# Patient Record
Sex: FEMALE | Race: WHITE | NOT HISPANIC OR LATINO | Employment: UNEMPLOYED | URBAN - METROPOLITAN AREA
[De-identification: names, ages, dates, MRNs, and addresses within clinical notes are randomized per-mention and may not be internally consistent; named-entity substitution may affect disease eponyms.]

---

## 2020-12-30 ENCOUNTER — TELEPHONE (OUTPATIENT)
Dept: PHYSICAL MEDICINE AND REHAB | Facility: CLINIC | Age: 25
End: 2020-12-30

## 2020-12-30 NOTE — TELEPHONE ENCOUNTER
Called patient, no answer.  Patient to contact scheduling department to schedule new patient appointment.

## 2020-12-30 NOTE — TELEPHONE ENCOUNTER
----- Message from Sidney Mcdonald sent at 12/30/2020 12:26 PM CST -----  Contact: Pt. [phone]  The patient would like to speak to someone regarding scheduling. Please contact the patient to discuss further.

## 2021-01-05 ENCOUNTER — TELEPHONE (OUTPATIENT)
Dept: PHYSICAL MEDICINE AND REHAB | Facility: CLINIC | Age: 26
End: 2021-01-05

## 2021-01-29 ENCOUNTER — TELEPHONE (OUTPATIENT)
Dept: AUDIOLOGY | Facility: CLINIC | Age: 26
End: 2021-01-29

## 2021-02-10 ENCOUNTER — CLINICAL SUPPORT (OUTPATIENT)
Dept: AUDIOLOGY | Facility: CLINIC | Age: 26
End: 2021-02-10
Payer: MEDICAID

## 2021-02-10 DIAGNOSIS — H81.12 BENIGN PAROXYSMAL POSITIONAL VERTIGO OF LEFT EAR: Primary | ICD-10-CM

## 2021-02-10 DIAGNOSIS — H81.8X1 RIGHT-SIDED VESTIBULAR WEAKNESS: ICD-10-CM

## 2021-02-10 PROCEDURE — 92537 PR CALORIC VSTBLR TEST W/REC BITHERMAL: ICD-10-PCS | Mod: 26,S$PBB,, | Performed by: AUDIOLOGIST

## 2021-02-10 PROCEDURE — 92540 BASIC VESTIBULAR EVALUATION: CPT | Mod: 26,S$PBB,, | Performed by: AUDIOLOGIST

## 2021-02-10 PROCEDURE — 92537 CALORIC VSTBLR TEST W/REC: CPT | Mod: PBBFAC | Performed by: AUDIOLOGIST

## 2021-02-10 PROCEDURE — 92540 PR VESTIBULAR EVAL NYSTAG FOVL&PERPH STIM OSCIL TRACKING: ICD-10-PCS | Mod: 26,S$PBB,, | Performed by: AUDIOLOGIST

## 2021-02-10 PROCEDURE — 92540 BASIC VESTIBULAR EVALUATION: CPT | Mod: PBBFAC | Performed by: AUDIOLOGIST

## 2021-02-10 PROCEDURE — 92537 CALORIC VSTBLR TEST W/REC: CPT | Mod: 26,S$PBB,, | Performed by: AUDIOLOGIST

## 2021-04-19 ENCOUNTER — PATIENT MESSAGE (OUTPATIENT)
Dept: ENDOCRINOLOGY | Facility: CLINIC | Age: 26
End: 2021-04-19

## 2021-04-26 ENCOUNTER — PATIENT MESSAGE (OUTPATIENT)
Dept: RESEARCH | Facility: HOSPITAL | Age: 26
End: 2021-04-26

## 2021-08-06 ENCOUNTER — OFFICE VISIT (OUTPATIENT)
Dept: ENDOCRINOLOGY | Facility: CLINIC | Age: 26
End: 2021-08-06
Attending: INTERNAL MEDICINE
Payer: MEDICAID

## 2021-08-06 DIAGNOSIS — F41.9 ANXIETY AND DEPRESSION: ICD-10-CM

## 2021-08-06 DIAGNOSIS — F32.A ANXIETY AND DEPRESSION: ICD-10-CM

## 2021-08-06 PROCEDURE — 99204 PR OFFICE/OUTPT VISIT, NEW, LEVL IV, 45-59 MIN: ICD-10-PCS | Mod: 95,KX,, | Performed by: INTERNAL MEDICINE

## 2021-08-06 PROCEDURE — 99204 OFFICE O/P NEW MOD 45 MIN: CPT | Mod: 95,KX,, | Performed by: INTERNAL MEDICINE

## 2021-08-06 RX ORDER — SUMATRIPTAN 50 MG/1
50 TABLET, FILM COATED ORAL
COMMUNITY
Start: 2021-03-12 | End: 2021-12-01

## 2021-08-06 RX ORDER — TESTOSTERONE GEL, 1% 10 MG/G
GEL TRANSDERMAL
Qty: 30 PACKET | Refills: 5 | Status: SHIPPED | OUTPATIENT
Start: 2021-08-06 | End: 2022-02-24 | Stop reason: SDUPTHER

## 2021-08-06 RX ORDER — DESVENLAFAXINE SUCCINATE 50 MG/1
50 TABLET, EXTENDED RELEASE ORAL DAILY
COMMUNITY
End: 2021-12-01

## 2021-08-06 RX ORDER — VERAPAMIL HYDROCHLORIDE 100 MG/1
CAPSULE, EXTENDED RELEASE ORAL DAILY
COMMUNITY
End: 2021-12-01

## 2021-08-06 RX ORDER — GABAPENTIN 600 MG/1
600 TABLET ORAL 3 TIMES DAILY
COMMUNITY
End: 2021-12-01

## 2021-11-19 ENCOUNTER — OFFICE VISIT (OUTPATIENT)
Dept: ENDOCRINOLOGY | Facility: CLINIC | Age: 26
End: 2021-11-19
Attending: INTERNAL MEDICINE
Payer: MEDICAID

## 2021-11-19 ENCOUNTER — LAB VISIT (OUTPATIENT)
Dept: LAB | Facility: HOSPITAL | Age: 26
End: 2021-11-19
Attending: INTERNAL MEDICINE
Payer: MEDICAID

## 2021-11-19 VITALS
HEART RATE: 84 BPM | BODY MASS INDEX: 26.99 KG/M2 | OXYGEN SATURATION: 97 % | DIASTOLIC BLOOD PRESSURE: 77 MMHG | SYSTOLIC BLOOD PRESSURE: 120 MMHG | HEIGHT: 67 IN | WEIGHT: 171.94 LBS

## 2021-11-19 DIAGNOSIS — F41.9 ANXIETY AND DEPRESSION: ICD-10-CM

## 2021-11-19 DIAGNOSIS — F32.A ANXIETY AND DEPRESSION: ICD-10-CM

## 2021-11-19 LAB
25(OH)D3+25(OH)D2 SERPL-MCNC: 43 NG/ML (ref 30–96)
ALBUMIN SERPL BCP-MCNC: 4.4 G/DL (ref 3.5–5.2)
ALP SERPL-CCNC: 58 U/L (ref 55–135)
ALT SERPL W/O P-5'-P-CCNC: 17 U/L (ref 10–44)
ANION GAP SERPL CALC-SCNC: 12 MMOL/L (ref 8–16)
AST SERPL-CCNC: 17 U/L (ref 10–40)
BILIRUB SERPL-MCNC: 0.6 MG/DL (ref 0.1–1)
BUN SERPL-MCNC: 13 MG/DL (ref 6–20)
CALCIUM SERPL-MCNC: 10.1 MG/DL (ref 8.7–10.5)
CHLORIDE SERPL-SCNC: 101 MMOL/L (ref 95–110)
CHOLEST SERPL-MCNC: 199 MG/DL (ref 120–199)
CHOLEST/HDLC SERPL: 2.6 {RATIO} (ref 2–5)
CO2 SERPL-SCNC: 26 MMOL/L (ref 23–29)
CREAT SERPL-MCNC: 0.8 MG/DL (ref 0.5–1.4)
ERYTHROCYTE [DISTWIDTH] IN BLOOD BY AUTOMATED COUNT: 11.9 % (ref 11.5–14.5)
EST. GFR  (AFRICAN AMERICAN): >60 ML/MIN/1.73 M^2
EST. GFR  (NON AFRICAN AMERICAN): >60 ML/MIN/1.73 M^2
ESTIMATED AVG GLUCOSE: 88 MG/DL (ref 68–131)
GLUCOSE SERPL-MCNC: 72 MG/DL (ref 70–110)
HBA1C MFR BLD: 4.7 % (ref 4–5.6)
HCT VFR BLD AUTO: 43.8 % (ref 37–48.5)
HDLC SERPL-MCNC: 78 MG/DL (ref 40–75)
HDLC SERPL: 39.2 % (ref 20–50)
HGB BLD-MCNC: 14.8 G/DL (ref 12–16)
LDLC SERPL CALC-MCNC: 100.6 MG/DL (ref 63–159)
MCH RBC QN AUTO: 31 PG (ref 27–31)
MCHC RBC AUTO-ENTMCNC: 33.8 G/DL (ref 32–36)
MCV RBC AUTO: 92 FL (ref 82–98)
NONHDLC SERPL-MCNC: 121 MG/DL
PLATELET # BLD AUTO: 238 K/UL (ref 150–450)
PMV BLD AUTO: 10.5 FL (ref 9.2–12.9)
POTASSIUM SERPL-SCNC: 4.4 MMOL/L (ref 3.5–5.1)
PROT SERPL-MCNC: 8.1 G/DL (ref 6–8.4)
RBC # BLD AUTO: 4.78 M/UL (ref 4–5.4)
SODIUM SERPL-SCNC: 139 MMOL/L (ref 136–145)
TESTOST SERPL-MCNC: 68 NG/DL (ref 5–73)
THYROPEROXIDASE IGG SERPL-ACNC: <6 IU/ML
TRIGL SERPL-MCNC: 102 MG/DL (ref 30–150)
TSH SERPL DL<=0.005 MIU/L-ACNC: 0.67 UIU/ML (ref 0.4–4)
WBC # BLD AUTO: 6.42 K/UL (ref 3.9–12.7)

## 2021-11-19 PROCEDURE — 99214 OFFICE O/P EST MOD 30 MIN: CPT | Mod: PBBFAC | Performed by: INTERNAL MEDICINE

## 2021-11-19 PROCEDURE — 80053 COMPREHEN METABOLIC PANEL: CPT | Performed by: INTERNAL MEDICINE

## 2021-11-19 PROCEDURE — 85027 COMPLETE CBC AUTOMATED: CPT | Performed by: INTERNAL MEDICINE

## 2021-11-19 PROCEDURE — 84443 ASSAY THYROID STIM HORMONE: CPT | Performed by: INTERNAL MEDICINE

## 2021-11-19 PROCEDURE — 99214 OFFICE O/P EST MOD 30 MIN: CPT | Mod: S$PBB,,, | Performed by: INTERNAL MEDICINE

## 2021-11-19 PROCEDURE — 99999 PR PBB SHADOW E&M-EST. PATIENT-LVL IV: CPT | Mod: PBBFAC,,, | Performed by: INTERNAL MEDICINE

## 2021-11-19 PROCEDURE — 99214 PR OFFICE/OUTPT VISIT, EST, LEVL IV, 30-39 MIN: ICD-10-PCS | Mod: S$PBB,,, | Performed by: INTERNAL MEDICINE

## 2021-11-19 PROCEDURE — 86376 MICROSOMAL ANTIBODY EACH: CPT | Performed by: INTERNAL MEDICINE

## 2021-11-19 PROCEDURE — 99999 PR PBB SHADOW E&M-EST. PATIENT-LVL IV: ICD-10-PCS | Mod: PBBFAC,,, | Performed by: INTERNAL MEDICINE

## 2021-11-19 PROCEDURE — 84403 ASSAY OF TOTAL TESTOSTERONE: CPT | Performed by: INTERNAL MEDICINE

## 2021-11-19 PROCEDURE — 83036 HEMOGLOBIN GLYCOSYLATED A1C: CPT | Performed by: INTERNAL MEDICINE

## 2021-11-19 PROCEDURE — 82306 VITAMIN D 25 HYDROXY: CPT | Performed by: INTERNAL MEDICINE

## 2021-11-19 PROCEDURE — 36415 COLL VENOUS BLD VENIPUNCTURE: CPT | Performed by: INTERNAL MEDICINE

## 2021-11-19 PROCEDURE — 80061 LIPID PANEL: CPT | Performed by: INTERNAL MEDICINE

## 2021-11-19 RX ORDER — SERTRALINE HYDROCHLORIDE 100 MG/1
100 TABLET, FILM COATED ORAL DAILY
COMMUNITY
Start: 2021-10-21

## 2021-11-19 RX ORDER — MIRTAZAPINE 15 MG/1
15 TABLET, FILM COATED ORAL NIGHTLY
COMMUNITY
Start: 2021-10-26

## 2021-12-01 ENCOUNTER — OFFICE VISIT (OUTPATIENT)
Dept: UROGYNECOLOGY | Facility: CLINIC | Age: 26
End: 2021-12-01
Payer: MEDICAID

## 2021-12-01 VITALS
BODY MASS INDEX: 27.48 KG/M2 | SYSTOLIC BLOOD PRESSURE: 121 MMHG | DIASTOLIC BLOOD PRESSURE: 77 MMHG | HEART RATE: 93 BPM | WEIGHT: 175.06 LBS | HEIGHT: 67 IN

## 2021-12-01 DIAGNOSIS — Z30.014 ENCOUNTER FOR INITIAL PRESCRIPTION OF INTRAUTERINE CONTRACEPTIVE DEVICE (IUD): ICD-10-CM

## 2021-12-01 DIAGNOSIS — Z01.419 ROUTINE GYNECOLOGICAL EXAMINATION: Primary | ICD-10-CM

## 2021-12-01 DIAGNOSIS — Z30.430 ENCOUNTER FOR INSERTION OF INTRAUTERINE CONTRACEPTIVE DEVICE (IUD): ICD-10-CM

## 2021-12-01 DIAGNOSIS — G43.109 MIGRAINE WITH AURA AND WITHOUT STATUS MIGRAINOSUS, NOT INTRACTABLE: ICD-10-CM

## 2021-12-01 DIAGNOSIS — F32.81 PMDD (PREMENSTRUAL DYSPHORIC DISORDER): ICD-10-CM

## 2021-12-01 DIAGNOSIS — Z12.4 ENCOUNTER FOR SCREENING FOR CERVICAL CANCER: ICD-10-CM

## 2021-12-01 DIAGNOSIS — N89.8 VAGINAL DISCHARGE: ICD-10-CM

## 2021-12-01 PROCEDURE — 99213 OFFICE O/P EST LOW 20 MIN: CPT | Mod: PBBFAC | Performed by: NURSE PRACTITIONER

## 2021-12-01 PROCEDURE — 99999 PR PBB SHADOW E&M-EST. PATIENT-LVL III: CPT | Mod: PBBFAC,,, | Performed by: NURSE PRACTITIONER

## 2021-12-01 PROCEDURE — 87491 CHLMYD TRACH DNA AMP PROBE: CPT | Mod: 59 | Performed by: NURSE PRACTITIONER

## 2021-12-01 PROCEDURE — 99385 PR PREVENTIVE VISIT,NEW,18-39: ICD-10-PCS | Mod: S$PBB,,, | Performed by: NURSE PRACTITIONER

## 2021-12-01 PROCEDURE — 87591 N.GONORRHOEAE DNA AMP PROB: CPT | Mod: 59 | Performed by: NURSE PRACTITIONER

## 2021-12-01 PROCEDURE — 99999 PR PBB SHADOW E&M-EST. PATIENT-LVL III: ICD-10-PCS | Mod: PBBFAC,,, | Performed by: NURSE PRACTITIONER

## 2021-12-01 PROCEDURE — 99385 PREV VISIT NEW AGE 18-39: CPT | Mod: S$PBB,,, | Performed by: NURSE PRACTITIONER

## 2021-12-01 PROCEDURE — 87481 CANDIDA DNA AMP PROBE: CPT | Mod: 59 | Performed by: NURSE PRACTITIONER

## 2021-12-01 PROCEDURE — 88142 CYTOPATH C/V THIN LAYER: CPT | Performed by: NURSE PRACTITIONER

## 2021-12-04 LAB
BACTERIAL VAGINOSIS DNA: NEGATIVE
C TRACH DNA SPEC QL NAA+PROBE: NOT DETECTED
CANDIDA GLABRATA DNA: NEGATIVE
CANDIDA KRUSEI DNA: NEGATIVE
CANDIDA RRNA VAG QL PROBE: NEGATIVE
N GONORRHOEA DNA SPEC QL NAA+PROBE: NOT DETECTED
T VAGINALIS RRNA GENITAL QL PROBE: NEGATIVE

## 2021-12-06 ENCOUNTER — PATIENT MESSAGE (OUTPATIENT)
Dept: UROGYNECOLOGY | Facility: CLINIC | Age: 26
End: 2021-12-06
Payer: MEDICAID

## 2021-12-09 ENCOUNTER — PATIENT MESSAGE (OUTPATIENT)
Dept: UROGYNECOLOGY | Facility: CLINIC | Age: 26
End: 2021-12-09
Payer: MEDICAID

## 2021-12-09 LAB
FINAL PATHOLOGIC DIAGNOSIS: NORMAL
Lab: NORMAL

## 2022-02-24 ENCOUNTER — OFFICE VISIT (OUTPATIENT)
Dept: ENDOCRINOLOGY | Facility: CLINIC | Age: 27
End: 2022-02-24
Payer: MEDICAID

## 2022-02-24 VITALS
WEIGHT: 177.56 LBS | DIASTOLIC BLOOD PRESSURE: 70 MMHG | SYSTOLIC BLOOD PRESSURE: 115 MMHG | HEIGHT: 67 IN | BODY MASS INDEX: 27.87 KG/M2

## 2022-02-24 DIAGNOSIS — F41.9 ANXIETY AND DEPRESSION: ICD-10-CM

## 2022-02-24 DIAGNOSIS — F32.A ANXIETY AND DEPRESSION: ICD-10-CM

## 2022-02-24 PROCEDURE — 99214 OFFICE O/P EST MOD 30 MIN: CPT | Mod: S$PBB,,, | Performed by: INTERNAL MEDICINE

## 2022-02-24 PROCEDURE — 1160F PR REVIEW ALL MEDS BY PRESCRIBER/CLIN PHARMACIST DOCUMENTED: ICD-10-PCS | Mod: CPTII,,, | Performed by: INTERNAL MEDICINE

## 2022-02-24 PROCEDURE — 3078F DIAST BP <80 MM HG: CPT | Mod: CPTII,,, | Performed by: INTERNAL MEDICINE

## 2022-02-24 PROCEDURE — 1160F RVW MEDS BY RX/DR IN RCRD: CPT | Mod: CPTII,,, | Performed by: INTERNAL MEDICINE

## 2022-02-24 PROCEDURE — 99213 OFFICE O/P EST LOW 20 MIN: CPT | Mod: PBBFAC | Performed by: INTERNAL MEDICINE

## 2022-02-24 PROCEDURE — 3008F PR BODY MASS INDEX (BMI) DOCUMENTED: ICD-10-PCS | Mod: CPTII,,, | Performed by: INTERNAL MEDICINE

## 2022-02-24 PROCEDURE — 3074F PR MOST RECENT SYSTOLIC BLOOD PRESSURE < 130 MM HG: ICD-10-PCS | Mod: CPTII,,, | Performed by: INTERNAL MEDICINE

## 2022-02-24 PROCEDURE — 1159F PR MEDICATION LIST DOCUMENTED IN MEDICAL RECORD: ICD-10-PCS | Mod: CPTII,,, | Performed by: INTERNAL MEDICINE

## 2022-02-24 PROCEDURE — 99999 PR PBB SHADOW E&M-EST. PATIENT-LVL III: CPT | Mod: PBBFAC,,, | Performed by: INTERNAL MEDICINE

## 2022-02-24 PROCEDURE — 3078F PR MOST RECENT DIASTOLIC BLOOD PRESSURE < 80 MM HG: ICD-10-PCS | Mod: CPTII,,, | Performed by: INTERNAL MEDICINE

## 2022-02-24 PROCEDURE — 99999 PR PBB SHADOW E&M-EST. PATIENT-LVL III: ICD-10-PCS | Mod: PBBFAC,,, | Performed by: INTERNAL MEDICINE

## 2022-02-24 PROCEDURE — 3008F BODY MASS INDEX DOCD: CPT | Mod: CPTII,,, | Performed by: INTERNAL MEDICINE

## 2022-02-24 PROCEDURE — 1159F MED LIST DOCD IN RCRD: CPT | Mod: CPTII,,, | Performed by: INTERNAL MEDICINE

## 2022-02-24 PROCEDURE — 99214 PR OFFICE/OUTPT VISIT, EST, LEVL IV, 30-39 MIN: ICD-10-PCS | Mod: S$PBB,,, | Performed by: INTERNAL MEDICINE

## 2022-02-24 PROCEDURE — 3074F SYST BP LT 130 MM HG: CPT | Mod: CPTII,,, | Performed by: INTERNAL MEDICINE

## 2022-02-24 RX ORDER — TESTOSTERONE GEL, 1% 10 MG/G
GEL TRANSDERMAL
Qty: 30 PACKET | Refills: 5 | Status: SHIPPED | OUTPATIENT
Start: 2022-02-24 | End: 2022-05-25 | Stop reason: SDUPTHER

## 2022-02-24 NOTE — PROGRESS NOTES
"Subjective:      Patient ID: Tasneem Mario is a 26 y.o. female.    Chief Complaint:  Gender     History of Present Illness  With regards to her gender incongruence care:    Gender identity - nonbinary   Gender fluid   They/ them      Current Therapy / counselor - psychiatrist and therapist   Therapist is aware and supportive - would like the names of a few therapist that work specifically with gender patients.    We started low-dose testosterone in August of 2021.  Currently taking quarter of a pack a day -    Is happy that they are taking it.  Is interested in increasing the dose.    Menses regular but q 23 days   Does have PMDD   Was on ocps in 2014     Goal: some masculinization       Gender Surgeries - none, but interested in top surgery        Fertility concerns - not interested  Saw Gynecology and has an appointment with Dr. Lewis   in interested in an IUD          Social:  Work -  Self employed -    Family -   Supportive   Relationship, orientation - has a casual sexual relationship with men and women  -  Housing - lives with  roomate       Anxiety and depression are stable - on meds      Has migraines and in on meds - no changes with starting testosterone      ROS:   As above    Objective:     /70   Ht 5' 7" (1.702 m)   Wt 80.6 kg (177 lb 9.3 oz)   BMI 27.81 kg/m²   BP Readings from Last 3 Encounters:   02/24/22 115/70   12/01/21 121/77   11/19/21 120/77     Wt Readings from Last 1 Encounters:   02/24/22 1117 80.6 kg (177 lb 9.3 oz)     Body mass index is 27.81 kg/m².      Physical Exam  Vitals reviewed.   Constitutional:       Appearance: Normal appearance.   Cardiovascular:      Rate and Rhythm: Normal rate.      Pulses: Normal pulses.   Pulmonary:      Effort: Pulmonary effort is normal.   Abdominal:      Palpations: Abdomen is soft.   Musculoskeletal:      Cervical back: Neck supple.   Lymphadenopathy:      Cervical: No cervical adenopathy.                Lab " Reviewed        Assessment and Plan     nonbinary  gender fluid   Genderqueer/nonbinary   gender incongruence   Reviewed therapy, side effects (both wanted and unwanted), possible adverse outcomes, expectations, compliance.  Reviewed what changes would be irreversible even with cessation of therapy (including facial hair, body hair, voice deepening and clitoral enlargement). Reviewed limited data on fertility     Reviewed the need for contraception as testosterone is not a contraceptive if having vaginal sex with non-trans men  - f/u  Dr Lewis for IUD     Patient encouraged to continue working with their therapist during the transition process.          Will increase Testosterone replacement therapy 1/2 of a pack a day   RTC in 3 months with labs   Noting  Nl hh/ for men is:     Hemoglobin 14.0-18.0 g/dL    Hematocrit 40.0-54.0 %        Healthy lifestyle stressed      Reviewed risks and benefits.  ? Possible increase cad  Expected increase in h/h, changes in lipids and body composition   Possible liver effects        Anxiety and depression  Follow-up with a mental health provider.  Noted that hormonal therapy can impact mood

## 2022-02-24 NOTE — ASSESSMENT & PLAN NOTE
Genderqueer/nonbinary   gender incongruence   Reviewed therapy, side effects (both wanted and unwanted), possible adverse outcomes, expectations, compliance.  Reviewed what changes would be irreversible even with cessation of therapy (including facial hair, body hair, voice deepening and clitoral enlargement). Reviewed limited data on fertility     Reviewed the need for contraception as testosterone is not a contraceptive if having vaginal sex with non-trans men  - f/u  Dr Lewis for IUD     Patient encouraged to continue working with their therapist during the transition process.          Will increase Testosterone replacement therapy 1/2 of a pack a day   RTC in 3 months with labs   Noting  Nl hh/ for men is:     Hemoglobin 14.0-18.0 g/dL    Hematocrit 40.0-54.0 %        Healthy lifestyle stressed      Reviewed risks and benefits.  ? Possible increase cad  Expected increase in h/h, changes in lipids and body composition   Possible liver effects

## 2022-04-04 ENCOUNTER — TELEPHONE (OUTPATIENT)
Dept: UROGYNECOLOGY | Facility: CLINIC | Age: 27
End: 2022-04-04
Payer: MEDICAID

## 2022-04-04 NOTE — TELEPHONE ENCOUNTER
Returned pt call no answer, Left voice message for pt to give the office a call back at [phone].

## 2022-04-04 NOTE — TELEPHONE ENCOUNTER
----- Message from Desirae Brown sent at 4/4/2022  2:06 PM CDT -----  Regarding: Call BAck  Name of Who is Calling:ANJANA LOPEZ [[id number]]              What is the request in detail:Patient requesting a call back. For medication to be sent to pharmacy for Yeast Infection. [facility] - [address]. Please assist              Can the clinic reply by [facility]: No              [facility]: [phone]

## 2022-04-06 ENCOUNTER — TELEPHONE (OUTPATIENT)
Dept: UROGYNECOLOGY | Facility: CLINIC | Age: 27
End: 2022-04-06
Payer: MEDICAID

## 2022-04-06 NOTE — TELEPHONE ENCOUNTER
Informed pt in order to be treated properly she needed an in office to be vaginally swabbed. Pt voiced understanding and stated she will contact her PCP for the rx and follow up with ANDREINA Steele at scheduled appt. Call ended.

## 2022-04-06 NOTE — TELEPHONE ENCOUNTER
----- Message from Estrella Parada sent at 4/6/2022 12:52 PM CDT -----  Regarding: missed call       Who Called: Camilla         Who Left Message for Patient: Iwona         Does the patient know what this is regarding? Yes         Best Call Back Number:[phone]         Additional Information:

## 2022-04-06 NOTE — TELEPHONE ENCOUNTER
Returned pt call no answer, Left voice message for pt to give the office a call back at [phone].

## 2022-04-06 NOTE — TELEPHONE ENCOUNTER
----- Message from Surekha Martin sent at 4/6/2022  2:56 PM CDT -----  Pt is returning call back from the provider     Pt call back is [phone]

## 2022-05-02 ENCOUNTER — TELEPHONE (OUTPATIENT)
Dept: UROGYNECOLOGY | Facility: CLINIC | Age: 27
End: 2022-05-02
Payer: MEDICAID

## 2022-05-02 DIAGNOSIS — Z30.430 ENCOUNTER FOR IUD INSERTION: ICD-10-CM

## 2022-05-02 DIAGNOSIS — Z30.014 ENCOUNTER FOR INITIAL PRESCRIPTION OF INTRAUTERINE CONTRACEPTIVE DEVICE (IUD): Primary | ICD-10-CM

## 2022-05-02 NOTE — TELEPHONE ENCOUNTER
----- Message from Agatha Oliver sent at 5/2/2022  2:18 PM CDT -----  Regarding: Pt called to cancel/reschedule her 9:30 am appt on 5/3/22 for an IUD and pt would like a call back today  Appointment Access Request:    Pt called to cancel/reschedule her 9:30 am appt on 5/3/22 for an IUD and pt would like a call back today    Pt can be reached at [phone]

## 2022-05-17 ENCOUNTER — PATIENT MESSAGE (OUTPATIENT)
Dept: ENDOCRINOLOGY | Facility: CLINIC | Age: 27
End: 2022-05-17
Payer: MEDICAID

## 2022-05-25 RX ORDER — TESTOSTERONE GEL, 1% 10 MG/G
GEL TRANSDERMAL
Qty: 45 PACKET | Refills: 0 | Status: SHIPPED | OUTPATIENT
Start: 2022-05-25 | End: 2022-10-20 | Stop reason: SDUPTHER

## 2022-05-26 ENCOUNTER — PROCEDURE VISIT (OUTPATIENT)
Dept: UROGYNECOLOGY | Facility: CLINIC | Age: 27
End: 2022-05-26
Payer: MEDICAID

## 2022-05-26 VITALS
SYSTOLIC BLOOD PRESSURE: 100 MMHG | WEIGHT: 184.75 LBS | BODY MASS INDEX: 29 KG/M2 | DIASTOLIC BLOOD PRESSURE: 70 MMHG | HEIGHT: 67 IN

## 2022-05-26 DIAGNOSIS — Z30.430 ENCOUNTER FOR INSERTION OF INTRAUTERINE CONTRACEPTIVE DEVICE (IUD): ICD-10-CM

## 2022-05-26 DIAGNOSIS — Z11.3 SCREENING EXAMINATION FOR STD (SEXUALLY TRANSMITTED DISEASE): Primary | ICD-10-CM

## 2022-05-26 PROCEDURE — 58300 INSERTION OF IUD-FUTURE: ICD-10-PCS | Mod: S$PBB,,, | Performed by: NURSE PRACTITIONER

## 2022-05-26 PROCEDURE — 58300 INSERT INTRAUTERINE DEVICE: CPT | Mod: PBBFAC | Performed by: NURSE PRACTITIONER

## 2022-05-26 PROCEDURE — 87491 CHLMYD TRACH DNA AMP PROBE: CPT | Mod: 59 | Performed by: NURSE PRACTITIONER

## 2022-05-26 PROCEDURE — 58300 INSERT INTRAUTERINE DEVICE: CPT | Mod: S$PBB,,, | Performed by: NURSE PRACTITIONER

## 2022-05-26 PROCEDURE — 87591 N.GONORRHOEAE DNA AMP PROB: CPT | Mod: 59 | Performed by: NURSE PRACTITIONER

## 2022-05-26 PROCEDURE — 87481 CANDIDA DNA AMP PROBE: CPT | Mod: 59 | Performed by: NURSE PRACTITIONER

## 2022-05-26 PROCEDURE — 87801 DETECT AGNT MULT DNA AMPLI: CPT | Performed by: NURSE PRACTITIONER

## 2022-05-26 RX ORDER — ALPRAZOLAM 0.5 MG/1
0.5 TABLET ORAL DAILY PRN
COMMUNITY
Start: 2021-12-20

## 2022-05-26 RX ORDER — CLINDAMYCIN PHOSPHATE AND BENZOYL PEROXIDE 10; 37.5 MG/G; MG/G
GEL TOPICAL
COMMUNITY
Start: 2021-12-13 | End: 2023-04-13

## 2022-05-26 RX ORDER — MONTELUKAST SODIUM 10 MG/1
TABLET ORAL
COMMUNITY
Start: 2022-02-23 | End: 2024-02-08 | Stop reason: ALTCHOICE

## 2022-05-26 RX ADMIN — COPPER 380 MM: 313.4 INTRAUTERINE DEVICE INTRAUTERINE at 01:05

## 2022-05-26 NOTE — PROCEDURES
Insertion of IUD-Future    Date/Time: 5/26/2022 1:00 PM  Performed by: Huong Steele NP  Authorized by: Huong Steele NP     Consent:     Consent obtained:  Written    Consent given by:  Patient    Procedure risks and benefits discussed: yes      Patient questions answered: yes      Patient agrees, verbalizes understanding, and wants to proceed: yes      Educational handouts given: yes      Instructions and paperwork completed: yes    Procedure:     Pelvic exam performed: yes      Negative GC/chlamydia test: yes      Negative urine pregnancy test: yes      Cervix cleaned and prepped: yes      Speculum placed in vagina: yes      Tenaculum applied to cervix: yes      Uterus sounded: yes      Uterus sound depth (cm):  9    IUD inserted with no complications: yes      IUD type:  ParaGard    Strings trimmed: yes    380 mm copper intrauterine device 380 square mm       Post-procedure:     Patient tolerated procedure well: yes      Patient will follow up after next period: yes

## 2022-05-26 NOTE — PATIENT INSTRUCTIONS
IUD inserted today  STI check per patient request today  Call for temperature > 101, excessive pain, bleeding or discharge

## 2022-05-27 ENCOUNTER — TELEPHONE (OUTPATIENT)
Dept: UROGYNECOLOGY | Facility: CLINIC | Age: 27
End: 2022-05-27
Payer: MEDICAID

## 2022-05-27 DIAGNOSIS — N94.89 UTERINE CRAMPING: Primary | ICD-10-CM

## 2022-05-27 RX ORDER — NAPROXEN 500 MG/1
500 TABLET ORAL 2 TIMES DAILY WITH MEALS
Qty: 30 TABLET | Refills: 1 | Status: SHIPPED | OUTPATIENT
Start: 2022-05-27 | End: 2022-07-21 | Stop reason: SDUPTHER

## 2022-05-27 RX ORDER — CYCLOBENZAPRINE HCL 10 MG
10 TABLET ORAL EVERY 8 HOURS PRN
Qty: 10 TABLET | Refills: 0 | Status: SHIPPED | OUTPATIENT
Start: 2022-05-27 | End: 2022-07-21

## 2022-05-27 NOTE — TELEPHONE ENCOUNTER
Pain 8-9 was present despite ibuprofen 800 mg every 3 hours x 5.  Reports pain level 6-7 today.  Instructed not to take ibuprofen 800 mg greater than 3 times. Will give naproxen 500 mg bid as it may help with cramping more. Will give flexeril for 3 days.  She will message how she is doing. Huong Steele, JUANP-BC

## 2022-05-27 NOTE — TELEPHONE ENCOUNTER
----- Message from Madeline Moore V RN sent at 5/27/2022 11:23 AM CDT -----  Regarding: FW: Birth control Pain    ----- Message -----  From: Magi Urbina  Sent: 5/27/2022  10:59 AM CDT  To: Cedric Borja Staff  Subject: Birth control Pain                               Name of Who is Calling:ANJANA LOPEZ [[id number]]          What is the request in detail: Patient is requesting a call back , patient stets she in a lot of pain  due to birth control           Can the clinic reply by [facility]: No           What Number to Call Back if not in [facility]:[phone]

## 2022-05-29 LAB
C TRACH DNA SPEC QL NAA+PROBE: NOT DETECTED
N GONORRHOEA DNA SPEC QL NAA+PROBE: NOT DETECTED

## 2022-05-30 ENCOUNTER — TELEPHONE (OUTPATIENT)
Dept: UROGYNECOLOGY | Facility: CLINIC | Age: 27
End: 2022-05-30
Payer: MEDICAID

## 2022-05-30 DIAGNOSIS — B37.31 YEAST VAGINITIS: Primary | ICD-10-CM

## 2022-05-30 LAB
BACTERIAL VAGINOSIS DNA: NEGATIVE
CANDIDA GLABRATA DNA: NEGATIVE
CANDIDA KRUSEI DNA: NEGATIVE
CANDIDA RRNA VAG QL PROBE: POSITIVE
T VAGINALIS RRNA GENITAL QL PROBE: NEGATIVE

## 2022-05-30 RX ORDER — FLUCONAZOLE 150 MG/1
150 TABLET ORAL
Qty: 3 TABLET | Refills: 0 | Status: SHIPPED | OUTPATIENT
Start: 2022-05-30 | End: 2022-06-04

## 2022-05-30 NOTE — TELEPHONE ENCOUNTER
----- Message from Kaden Rai sent at 5/30/2022 11:31 AM CDT -----  PLEASE CALL PT REGARDING HER TEST RESULTS [phone]

## 2022-05-31 ENCOUNTER — PATIENT MESSAGE (OUTPATIENT)
Dept: UROGYNECOLOGY | Facility: CLINIC | Age: 27
End: 2022-05-31
Payer: MEDICAID

## 2022-07-21 ENCOUNTER — OFFICE VISIT (OUTPATIENT)
Dept: UROGYNECOLOGY | Facility: CLINIC | Age: 27
End: 2022-07-21
Payer: MEDICAID

## 2022-07-21 VITALS
BODY MASS INDEX: 28.09 KG/M2 | SYSTOLIC BLOOD PRESSURE: 110 MMHG | WEIGHT: 179 LBS | DIASTOLIC BLOOD PRESSURE: 64 MMHG | HEIGHT: 67 IN

## 2022-07-21 DIAGNOSIS — Z30.431 IUD CHECK UP: Primary | ICD-10-CM

## 2022-07-21 DIAGNOSIS — N94.89 UTERINE CRAMPING: ICD-10-CM

## 2022-07-21 PROCEDURE — 1160F PR REVIEW ALL MEDS BY PRESCRIBER/CLIN PHARMACIST DOCUMENTED: ICD-10-PCS | Mod: CPTII,,, | Performed by: NURSE PRACTITIONER

## 2022-07-21 PROCEDURE — 99213 OFFICE O/P EST LOW 20 MIN: CPT | Mod: PBBFAC | Performed by: NURSE PRACTITIONER

## 2022-07-21 PROCEDURE — 99999 PR PBB SHADOW E&M-EST. PATIENT-LVL III: CPT | Mod: PBBFAC,,, | Performed by: NURSE PRACTITIONER

## 2022-07-21 PROCEDURE — 3078F DIAST BP <80 MM HG: CPT | Mod: CPTII,,, | Performed by: NURSE PRACTITIONER

## 2022-07-21 PROCEDURE — 1159F MED LIST DOCD IN RCRD: CPT | Mod: CPTII,,, | Performed by: NURSE PRACTITIONER

## 2022-07-21 PROCEDURE — 1160F RVW MEDS BY RX/DR IN RCRD: CPT | Mod: CPTII,,, | Performed by: NURSE PRACTITIONER

## 2022-07-21 PROCEDURE — 1159F PR MEDICATION LIST DOCUMENTED IN MEDICAL RECORD: ICD-10-PCS | Mod: CPTII,,, | Performed by: NURSE PRACTITIONER

## 2022-07-21 PROCEDURE — 3078F PR MOST RECENT DIASTOLIC BLOOD PRESSURE < 80 MM HG: ICD-10-PCS | Mod: CPTII,,, | Performed by: NURSE PRACTITIONER

## 2022-07-21 PROCEDURE — 99213 OFFICE O/P EST LOW 20 MIN: CPT | Mod: S$PBB,,, | Performed by: NURSE PRACTITIONER

## 2022-07-21 PROCEDURE — 3074F SYST BP LT 130 MM HG: CPT | Mod: CPTII,,, | Performed by: NURSE PRACTITIONER

## 2022-07-21 PROCEDURE — 99999 PR PBB SHADOW E&M-EST. PATIENT-LVL III: ICD-10-PCS | Mod: PBBFAC,,, | Performed by: NURSE PRACTITIONER

## 2022-07-21 PROCEDURE — 99213 PR OFFICE/OUTPT VISIT, EST, LEVL III, 20-29 MIN: ICD-10-PCS | Mod: S$PBB,,, | Performed by: NURSE PRACTITIONER

## 2022-07-21 PROCEDURE — 3008F BODY MASS INDEX DOCD: CPT | Mod: CPTII,,, | Performed by: NURSE PRACTITIONER

## 2022-07-21 PROCEDURE — 3008F PR BODY MASS INDEX (BMI) DOCUMENTED: ICD-10-PCS | Mod: CPTII,,, | Performed by: NURSE PRACTITIONER

## 2022-07-21 PROCEDURE — 3074F PR MOST RECENT SYSTOLIC BLOOD PRESSURE < 130 MM HG: ICD-10-PCS | Mod: CPTII,,, | Performed by: NURSE PRACTITIONER

## 2022-07-21 RX ORDER — NAPROXEN 500 MG/1
500 TABLET ORAL 2 TIMES DAILY WITH MEALS
Qty: 30 TABLET | Refills: 1 | Status: SHIPPED | OUTPATIENT
Start: 2022-07-21

## 2022-07-29 NOTE — PROGRESS NOTES
2022    SUBJECTIVE:   26 y.o.  for IUD check.    Past Medical History:   Diagnosis Date    Migraine        Past Surgical History:   Procedure Laterality Date    TONSILLECTOMY         Family History   Problem Relation Age of Onset    Diabetes Father     Hypertension Father        Social History     Socioeconomic History    Marital status: Single   Tobacco Use    Smoking status: Never Smoker    Smokeless tobacco: Never Used   Substance and Sexual Activity    Alcohol use: Yes     Comment: social     Drug use: Yes     Types: Marijuana    Sexual activity: Yes     Partners: Female, Male       Current Outpatient Medications   Medication Sig Dispense Refill    ALPRAZolam (XANAX) 0.5 MG tablet Take 0.5 mg by mouth daily as needed.      mirtazapine (REMERON) 15 MG tablet Take 15 mg by mouth nightly.      montelukast (SINGULAIR) 10 mg tablet SMARTSI Tablet(s) By Mouth Every Evening      ONEXTON 1.2 %(1 % base) -3.75 % GlwP SMARTSI pump Topical Daily      sertraline (ZOLOFT) 100 MG tablet Take 100 mg by mouth once daily.      testosterone (ANDROGEL) 1 % (50 mg/5 gram) GlPk Apply 1/2 pack daily in the morning to the shoulders and/or upper arms 45 packet 0    naproxen (NAPROSYN) 500 MG tablet Take 1 tablet (500 mg total) by mouth 2 (two) times daily with meals. 30 tablet 1     Current Facility-Administered Medications   Medication Dose Route Frequency Provider Last Rate Last Admin    copper intrauterine device 380 square mm  mm  380 mm Intrauterine  Huong Steele NP   380 mm at 22 1300       Review of patient's allergies indicates:  No Known Allergies    Patient's last menstrual period was 2022.    Pap 2022 normal       OB History        0    Para   0    Term   0       0    AB   0    Living   0       SAB   0    IAB   0    Ectopic   0    Multiple   0    Live Births   0                   ROS:  Feeling well.   No dyspnea or chest pain on exertion.    No  "abdominal pain, change in bowel habits, black or bloody stools.    No urinary tract symptoms.   GYN ROS: no breast pain or new or enlarging lumps on self exam, complains of menstrual cramping and slightly longer menses.   No neurological complaints.    OBJECTIVE:   The patient appears well, alert, oriented x 3, in no distress.  /64 (BP Location: Right arm, Patient Position: Sitting, BP Method: Medium (Automatic))   Ht 5' 7" (1.702 m)   Wt 81.2 kg (179 lb 0.2 oz)   LMP 07/07/2022   BMI 28.04 kg/m²   Abdomen soft without tenderness, guarding, mass or organomegaly.   Extremities show no edema, normal peripheral pulses.   Neurological is normal, no focal findings.        PELVIC EXAM:   VULVA: normal appearing vulva with no masses, tenderness or lesions,   VAGINA: normal appearing vagina with normal color and discharge, no lesions,  CERVIX: normal appearing cervix without discharge or lesions, iud strings visible,   UTERUS: uterus is normal size, shape, consistency and nontender, ADNEXA: no masses,   RECTAL: deferred    ASSESSMENT:   1. Uterine cramping  naproxen (NAPROSYN) 500 MG tablet       PLAN:     IUD check normal  RTC for annual in 12/2022      I spent a total of 20 minutes on the day of the visit.  This includes face to face time and non-face to face time preparing to see the patient (eg, review of tests), obtaining and/or reviewing separately obtained history, documenting clinical information in the electronic or other health record, independently interpreting results and communicating results to the patient/family/caregiver, or care coordinator.    Huong CORONA [facility]  Division of Female Pelvic Medicine and Reconstructive Surgery  Department of Obstetrics & Gynecology          "

## 2022-08-11 ENCOUNTER — LAB VISIT (OUTPATIENT)
Dept: LAB | Facility: OTHER | Age: 27
End: 2022-08-11
Attending: INTERNAL MEDICINE
Payer: MEDICAID

## 2022-08-11 LAB
ALBUMIN SERPL BCP-MCNC: 4.3 G/DL (ref 3.5–5.2)
ALP SERPL-CCNC: 52 U/L (ref 55–135)
ALT SERPL W/O P-5'-P-CCNC: 17 U/L (ref 10–44)
ANION GAP SERPL CALC-SCNC: 8 MMOL/L (ref 8–16)
AST SERPL-CCNC: 19 U/L (ref 10–40)
BILIRUB SERPL-MCNC: 0.5 MG/DL (ref 0.1–1)
BUN SERPL-MCNC: 13 MG/DL (ref 6–20)
CALCIUM SERPL-MCNC: 9.7 MG/DL (ref 8.7–10.5)
CHLORIDE SERPL-SCNC: 104 MMOL/L (ref 95–110)
CO2 SERPL-SCNC: 29 MMOL/L (ref 23–29)
CREAT SERPL-MCNC: 0.9 MG/DL (ref 0.5–1.4)
ERYTHROCYTE [DISTWIDTH] IN BLOOD BY AUTOMATED COUNT: 11.7 % (ref 11.5–14.5)
EST. GFR  (NO RACE VARIABLE): >60 ML/MIN/1.73 M^2
GLUCOSE SERPL-MCNC: 88 MG/DL (ref 70–110)
HCT VFR BLD AUTO: 43.4 % (ref 37–48.5)
HGB BLD-MCNC: 15 G/DL (ref 12–16)
MCH RBC QN AUTO: 31.5 PG (ref 27–31)
MCHC RBC AUTO-ENTMCNC: 34.6 G/DL (ref 32–36)
MCV RBC AUTO: 91 FL (ref 82–98)
PLATELET # BLD AUTO: 253 K/UL (ref 150–450)
PMV BLD AUTO: 9.8 FL (ref 9.2–12.9)
POTASSIUM SERPL-SCNC: 5 MMOL/L (ref 3.5–5.1)
PROT SERPL-MCNC: 7.3 G/DL (ref 6–8.4)
RBC # BLD AUTO: 4.76 M/UL (ref 4–5.4)
SODIUM SERPL-SCNC: 141 MMOL/L (ref 136–145)
TESTOST SERPL-MCNC: 54 NG/DL (ref 5–73)
WBC # BLD AUTO: 6.34 K/UL (ref 3.9–12.7)

## 2022-08-11 PROCEDURE — 84403 ASSAY OF TOTAL TESTOSTERONE: CPT | Performed by: INTERNAL MEDICINE

## 2022-08-11 PROCEDURE — 85027 COMPLETE CBC AUTOMATED: CPT | Performed by: INTERNAL MEDICINE

## 2022-08-11 PROCEDURE — 80053 COMPREHEN METABOLIC PANEL: CPT | Performed by: INTERNAL MEDICINE

## 2022-08-11 PROCEDURE — 36415 COLL VENOUS BLD VENIPUNCTURE: CPT | Performed by: INTERNAL MEDICINE

## 2022-10-06 DIAGNOSIS — Z78.9 FEMALE-TO-MALE TRANSGENDER PERSON: ICD-10-CM

## 2022-10-06 NOTE — TELEPHONE ENCOUNTER
----- Message from Megan Mac sent at 10/6/2022  2:09 PM CDT -----  Contact: [phone]  the patient is calling to get scheduled for a appt.  Pt access tried but no appts are available.  the patient can be reached at. [phone]      Pt also stated needs PA for the medication of testosterone (ANDROGEL) 1 % (50 mg/5 gram) GlPk

## 2022-10-20 DIAGNOSIS — Z78.9 FEMALE-TO-MALE TRANSGENDER PERSON: ICD-10-CM

## 2022-10-20 NOTE — TELEPHONE ENCOUNTER
----- Message from Valery Cagle MA sent at 10/12/2022  4:54 PM CDT -----  Regarding: FW: medication refill  Contact: Tasneem    ----- Message -----  From: Leatha Reed MA  Sent: 10/12/2022  12:52 PM CDT  To: Josie ENGLISH Staff  Subject: medication refill                                Pt is requesting a refill on the following medication: testosterone (ANDROGEL) 1 % (50 mg/5 gram) GlPk [facility] [address] [address]  Phone: [phone] Fax: [phone]

## 2022-10-21 RX ORDER — TESTOSTERONE GEL, 1% 10 MG/G
GEL TRANSDERMAL
Qty: 45 PACKET | Refills: 0 | Status: SHIPPED | OUTPATIENT
Start: 2022-10-21 | End: 2023-01-06

## 2022-10-24 ENCOUNTER — PATIENT MESSAGE (OUTPATIENT)
Dept: ENDOCRINOLOGY | Facility: CLINIC | Age: 27
End: 2022-10-24
Payer: MEDICAID

## 2022-10-25 ENCOUNTER — TELEPHONE (OUTPATIENT)
Dept: ENDOCRINOLOGY | Facility: CLINIC | Age: 27
End: 2022-10-25
Payer: MEDICAID

## 2023-01-06 ENCOUNTER — OFFICE VISIT (OUTPATIENT)
Dept: ENDOCRINOLOGY | Facility: CLINIC | Age: 28
End: 2023-01-06
Attending: INTERNAL MEDICINE
Payer: MEDICAID

## 2023-01-06 ENCOUNTER — CLINICAL SUPPORT (OUTPATIENT)
Dept: ENDOCRINOLOGY | Facility: CLINIC | Age: 28
End: 2023-01-06
Payer: MEDICAID

## 2023-01-06 DIAGNOSIS — Z79.899 TRANSGENDER PERSON ON HORMONE THERAPY: Primary | ICD-10-CM

## 2023-01-06 DIAGNOSIS — F64.0 TRANSGENDER PERSON ON HORMONE THERAPY: Primary | ICD-10-CM

## 2023-01-06 PROCEDURE — 1160F PR REVIEW ALL MEDS BY PRESCRIBER/CLIN PHARMACIST DOCUMENTED: ICD-10-PCS | Mod: CPTII,95,, | Performed by: INTERNAL MEDICINE

## 2023-01-06 PROCEDURE — 96372 THER/PROPH/DIAG INJ SC/IM: CPT | Mod: PBBFAC

## 2023-01-06 PROCEDURE — 1159F PR MEDICATION LIST DOCUMENTED IN MEDICAL RECORD: ICD-10-PCS | Mod: CPTII,95,, | Performed by: INTERNAL MEDICINE

## 2023-01-06 PROCEDURE — 1160F RVW MEDS BY RX/DR IN RCRD: CPT | Mod: CPTII,95,, | Performed by: INTERNAL MEDICINE

## 2023-01-06 PROCEDURE — 99213 OFFICE O/P EST LOW 20 MIN: CPT | Mod: 95,,, | Performed by: INTERNAL MEDICINE

## 2023-01-06 PROCEDURE — 99213 PR OFFICE/OUTPT VISIT, EST, LEVL III, 20-29 MIN: ICD-10-PCS | Mod: 95,,, | Performed by: INTERNAL MEDICINE

## 2023-01-06 PROCEDURE — 1159F MED LIST DOCD IN RCRD: CPT | Mod: CPTII,95,, | Performed by: INTERNAL MEDICINE

## 2023-01-06 RX ORDER — TESTOSTERONE CYPIONATE 200 MG/ML
50 INJECTION, SOLUTION INTRAMUSCULAR
Qty: 2 ML | Refills: 5 | Status: SHIPPED | OUTPATIENT
Start: 2023-01-06 | End: 2023-10-24 | Stop reason: SDUPTHER

## 2023-01-06 RX ORDER — NEEDLES, SAFETY 22GX1 1/2"
NEEDLE, DISPOSABLE MISCELLANEOUS
Qty: 10 EACH | Refills: 5 | Status: SHIPPED | OUTPATIENT
Start: 2023-01-06

## 2023-01-06 RX ORDER — TESTOSTERONE CYPIONATE 200 MG/ML
50 INJECTION, SOLUTION INTRAMUSCULAR
Status: COMPLETED | OUTPATIENT
Start: 2023-01-06 | End: 2023-01-06

## 2023-01-06 RX ADMIN — TESTOSTERONE CYPIONATE 50 MG: 200 INJECTION, SOLUTION INTRAMUSCULAR at 01:01

## 2023-01-06 NOTE — PATIENT INSTRUCTIONS
Thank you for completing a virtual visit with me!     Per our conversation, my nurse to [facility] will reach out to arrange for testosterone teaching this morning.    We will plan a telemedicine or an in-clinic visit in 3 months with labs prior to that appointment.    My staff will contact you to schedule the above.     Please let me know if you have any other questions.    Thank you,  Laura Galindo MD

## 2023-01-06 NOTE — PROGRESS NOTES
Subjective:      Patient ID: Tasneem Mario is a 27 y.o. female.    Chief Complaint:  Gender     History of Present Illness  With regards to her gender incongruence care:    Gender identity - nonbinary   Gender fluid   They/ them      Therapists at the time of starting hormonal therapy- yes.  Therapist is aware and supportive      We started low-dose testosterone in August of 2021.  Currently taking 1/2  of a pack a day -    Had difficulty getting the medication.  Need a prior Auth.  Is interested in changing to injectable had difficulty getting the medication due to needing a prior Auth.    Would like to switch to injectable    Goal: some masculinization       Gender Surgeries - none, but interested in top surgery        Fertility concerns - IUD            Social:  Work -  Self employed -    Family -   Supportive   Relationship, orientation - has a casual sexual relationship with men and women  -  Housing - lives with  roomate       Anxiety and depression are stable - on meds      Has migraines and in on meds - no changes with starting testosterone      ROS:   As above    Objective:     There were no vitals taken for this visit.  BP Readings from Last 3 Encounters:   07/21/22 110/64   05/26/22 100/70   02/24/22 115/70     Wt Readings from Last 1 Encounters:   07/21/22 1630 81.2 kg (179 lb 0.2 oz)     There is no height or weight on file to calculate BMI.      Physical Exam  Constitutional:       Appearance: Normal appearance.   Psychiatric:         Attention and Perception: Attention normal.         Mood and Affect: Mood normal.         Speech: Speech normal.         Behavior: Behavior normal.         Thought Content: Thought content normal.         Judgment: Judgment normal.              Lab Reviewed        Assessment and Plan     nonbinary (gender fluid) (they/them)   Transman: gender incongruence   Reviewed therapy, side effects (both wanted and unwanted), possible adverse outcomes,  expectations, compliance.          Will start Testosterone replacement therapy 50 mg q 1 week   Self administration taught in clinic today.  Gave testosterone 50 mg sq  once   RTC in 3 months with labs   Noting  Nl hh/ for men is:     Hemoglobin 14.0-18.0 g/dL    Hematocrit 40.0-54.0 %              The patient location is: home  The chief complaint leading to consultation is: gender    Visit type: audiovisual    Face to Face time with patient: 20 minutes of total time spent on the encounter, which includes face to face time and non-face to face time preparing to see the patient (eg, review of tests), Obtaining and/or reviewing separately obtained history, Documenting clinical information in the electronic or other health record, Independently interpreting results (not separately reported) and communicating results to the patient/family/caregiver, or Care coordination (not separately reported).         Each patient to whom he or she provides medical services by telemedicine is:  (1) informed of the relationship between the physician and patient and the respective role of any other health care provider with respect to management of the patient; and (2) notified that he or she may decline to receive medical services by telemedicine and may withdraw from such care at any time.

## 2023-01-06 NOTE — PROGRESS NOTES
Patient arrives in clinic for injection teaching.  Instructed patient how to draw up and self administer testosterone using aseptic technique.  Patient returned demonstration and self injected testosterone to left thigh using 25 gauge needle, patient tolerated well.

## 2023-01-06 NOTE — ASSESSMENT & PLAN NOTE
Transman: gender incongruence   Reviewed therapy, side effects (both wanted and unwanted), possible adverse outcomes, expectations, compliance.          Will start Testosterone replacement therapy 50 mg q 1 week   Self administration taught in clinic today.  Gave testosterone 50 mg sq  once   RTC in 3 months with labs   Noting  Nl hh/ for men is:     Hemoglobin 14.0-18.0 g/dL    Hematocrit 40.0-54.0 %

## 2023-01-18 ENCOUNTER — LAB VISIT (OUTPATIENT)
Dept: LAB | Facility: HOSPITAL | Age: 28
End: 2023-01-18
Payer: MEDICAID

## 2023-01-18 ENCOUNTER — OFFICE VISIT (OUTPATIENT)
Dept: OBSTETRICS AND GYNECOLOGY | Facility: CLINIC | Age: 28
End: 2023-01-18
Payer: MEDICAID

## 2023-01-18 DIAGNOSIS — Z11.3 SCREEN FOR STD (SEXUALLY TRANSMITTED DISEASE): Primary | ICD-10-CM

## 2023-01-18 DIAGNOSIS — Z11.3 SCREEN FOR STD (SEXUALLY TRANSMITTED DISEASE): ICD-10-CM

## 2023-01-18 LAB
HBV SURFACE AG SERPL QL IA: NORMAL
HCV AB SERPL QL IA: NORMAL
HIV 1+2 AB+HIV1 P24 AG SERPL QL IA: NORMAL

## 2023-01-18 PROCEDURE — 87389 HIV-1 AG W/HIV-1&-2 AB AG IA: CPT

## 2023-01-18 PROCEDURE — 99212 OFFICE O/P EST SF 10 MIN: CPT | Mod: PBBFAC,PN

## 2023-01-18 PROCEDURE — 86803 HEPATITIS C AB TEST: CPT

## 2023-01-18 PROCEDURE — 99999 PR PBB SHADOW E&M-EST. PATIENT-LVL II: ICD-10-PCS | Mod: PBBFAC,,,

## 2023-01-18 PROCEDURE — 99213 OFFICE O/P EST LOW 20 MIN: CPT | Mod: S$PBB,,,

## 2023-01-18 PROCEDURE — 99213 PR OFFICE/OUTPT VISIT, EST, LEVL III, 20-29 MIN: ICD-10-PCS | Mod: S$PBB,,,

## 2023-01-18 PROCEDURE — 81514 NFCT DS BV&VAGINITIS DNA ALG: CPT

## 2023-01-18 PROCEDURE — 1160F PR REVIEW ALL MEDS BY PRESCRIBER/CLIN PHARMACIST DOCUMENTED: ICD-10-PCS | Mod: CPTII,,,

## 2023-01-18 PROCEDURE — 1159F PR MEDICATION LIST DOCUMENTED IN MEDICAL RECORD: ICD-10-PCS | Mod: CPTII,,,

## 2023-01-18 PROCEDURE — 86592 SYPHILIS TEST NON-TREP QUAL: CPT

## 2023-01-18 PROCEDURE — 1159F MED LIST DOCD IN RCRD: CPT | Mod: CPTII,,,

## 2023-01-18 PROCEDURE — 87340 HEPATITIS B SURFACE AG IA: CPT

## 2023-01-18 PROCEDURE — 99999 PR PBB SHADOW E&M-EST. PATIENT-LVL II: CPT | Mod: PBBFAC,,,

## 2023-01-18 PROCEDURE — 1160F RVW MEDS BY RX/DR IN RCRD: CPT | Mod: CPTII,,,

## 2023-01-18 PROCEDURE — 87591 N.GONORRHOEAE DNA AMP PROB: CPT

## 2023-01-18 PROCEDURE — 36415 COLL VENOUS BLD VENIPUNCTURE: CPT | Mod: PN

## 2023-01-18 NOTE — PROGRESS NOTES
CC: Screening for sexually transmitted infection    Tasneem Mario is a 27 y.o. female  presents for STD screening  No LMP recorded..  Denies any new rashes or lesions.  Denies pelvic or abdominal pain.  Denies vulvovaginal itching or irritation.  Denies abnormal or foul vaginal discharge.    Last pap: 2021      ROS:  GENERAL: Denies weight gain or weight loss. Feeling well overall.   SKIN: Denies rash or lesions.   HEAD: Denies head injury or headache.   NODES: Denies enlarged lymph nodes.   CHEST: Denies chest pain or shortness of breath.   CARDIOVASCULAR: Denies palpitations or left sided chest pain.   ABDOMEN: No abdominal pain, constipation, diarrhea, nausea, vomiting or rectal bleeding.   URINARY: No frequency, dysuria, hematuria, or burning on urination.  REPRODUCTIVE: See HPI.   BREASTS: The patient performs breast self-examination and denies pain, lumps, or nipple discharge.   HEMATOLOGIC: No easy bruisability or excessive bleeding.   MUSCULOSKELETAL: Denies joint pain or swelling.   NEUROLOGIC: Denies syncope or weakness.   PSYCHIATRIC: Denies depression, anxiety or mood swings.      PHYSICAL EXAM:    APPEARANCE: Well nourished, well developed, in no acute distress.  AFFECT: Alert and oriented x 3  SKIN: Warm, dry, & intact. No acne or hirsutism.  NECK: Neck symmetric  NODES: No inguinal or femoral lymph node enlargement  CHEST:  Easy, even breaths.  ABDOMEN: Soft.  Nontender, nondistended.  PELVIC: Normal external genitalia without lesions.  Normal hair distribution.  Adequate perineal body, normal urethral meatus.    Vagina moist and well rugated without lesions or discharge.  Cervix pink, without lesions, discharge or tenderness.  No significant cystocele or rectocele.    Bimanual exam shows uterus to be normal size, regular, mobile and nontender.  +IUD strings visualized Adnexa without masses or tenderness.    EXTREMITIES: No edema.    Screen for STD (sexually transmitted  disease)  -     C. trachomatis/N. gonorrhoeae by AMP DNA [facility]; Vagina  -     Vaginosis Screen by DNA Probe  -     Hepatitis B Surface Antigen; Future; Expected date: 01/18/2023  -     Hepatitis C Antibody; Future; Expected date: 01/18/2023  -     RPR; Future; Expected date: 01/18/2023  -     HIV 1/2 Ag/Ab (4th Gen); Future; Expected date: 01/18/2023            Patient was counseled today on prevention of STDs with use of condoms.  We also reviewed A.C.S. Pap guidelines and recommendations for yearly pelvic exams, mammograms and monthly self breast exams; to see her PCP for other health maintenance.     Followup pending lab results      JEOVANNY Arana

## 2023-01-19 LAB
C TRACH DNA SPEC QL NAA+PROBE: NOT DETECTED
N GONORRHOEA DNA SPEC QL NAA+PROBE: NOT DETECTED
RPR SER QL: NORMAL

## 2023-01-20 LAB
BACTERIAL VAGINOSIS DNA: NEGATIVE
CANDIDA GLABRATA DNA: NEGATIVE
CANDIDA KRUSEI DNA: NEGATIVE
CANDIDA RRNA VAG QL PROBE: NEGATIVE
T VAGINALIS RRNA GENITAL QL PROBE: NEGATIVE

## 2023-04-13 ENCOUNTER — OFFICE VISIT (OUTPATIENT)
Dept: ENDOCRINOLOGY | Facility: CLINIC | Age: 28
End: 2023-04-13
Attending: INTERNAL MEDICINE
Payer: MEDICAID

## 2023-04-13 DIAGNOSIS — Z79.899 TRANSGENDER PERSON ON HORMONE THERAPY: ICD-10-CM

## 2023-04-13 DIAGNOSIS — F64.0 TRANSGENDER PERSON ON HORMONE THERAPY: ICD-10-CM

## 2023-04-13 PROCEDURE — 1159F PR MEDICATION LIST DOCUMENTED IN MEDICAL RECORD: ICD-10-PCS | Mod: CPTII,95,, | Performed by: INTERNAL MEDICINE

## 2023-04-13 PROCEDURE — 99214 OFFICE O/P EST MOD 30 MIN: CPT | Mod: 95,,, | Performed by: INTERNAL MEDICINE

## 2023-04-13 PROCEDURE — 1160F RVW MEDS BY RX/DR IN RCRD: CPT | Mod: CPTII,95,, | Performed by: INTERNAL MEDICINE

## 2023-04-13 PROCEDURE — 1160F PR REVIEW ALL MEDS BY PRESCRIBER/CLIN PHARMACIST DOCUMENTED: ICD-10-PCS | Mod: CPTII,95,, | Performed by: INTERNAL MEDICINE

## 2023-04-13 PROCEDURE — 1159F MED LIST DOCD IN RCRD: CPT | Mod: CPTII,95,, | Performed by: INTERNAL MEDICINE

## 2023-04-13 PROCEDURE — 99214 PR OFFICE/OUTPT VISIT, EST, LEVL IV, 30-39 MIN: ICD-10-PCS | Mod: 95,,, | Performed by: INTERNAL MEDICINE

## 2023-04-13 RX ORDER — TRETINOIN 0.1 MG/G
GEL TOPICAL
COMMUNITY
Start: 2022-12-27

## 2023-04-13 RX ORDER — PANTOPRAZOLE SODIUM 40 MG/1
40 TABLET, DELAYED RELEASE ORAL
COMMUNITY
Start: 2023-03-28 | End: 2024-02-08

## 2023-04-13 NOTE — PATIENT INSTRUCTIONS
Thank you for completing a virtual visit with me!     Per our conversation, please continue the testosterone therapy at the current dose.  My nurse will reach out for labs to be done soon.  Remember to have the labs done 1-2 days prior to your injection    We will plan a telemedicine or an in-clinic visit in 12 months with labs prior to that appointment.    My staff will contact you to schedule the above.     Please let me know if you have any other questions.    Thank you,  Laura Galindo MD

## 2023-04-13 NOTE — ASSESSMENT & PLAN NOTE
Transman: gender incongruence   Reviewed therapy, side effects (both wanted and unwanted), possible adverse outcomes, expectations, compliance.          Will continue Testosterone replacement therapy 50 mg q 1 week      Labs  They will let me know if they are in interested  the dose or surgery prior to their yearly   visit  RTC in 12  months with labs   Noting  Nl hh/ for men is:     Hemoglobin 14.0-18.0 g/dL    Hematocrit 40.0-54.0 %

## 2023-04-13 NOTE — PROGRESS NOTES
Subjective:      Patient ID: Tasneem Mario is a 27 y.o. female.    Chief Complaint:  Gender     History of Present Illness  With regards to her gender incongruence care:    Gender identity - nonbinary   Gender fluid   They/ them      Therapists at the time of starting hormonal therapy- yes.  Therapist is aware and supportive      We started low-dose testosterone in August of 2021.  Was  taking 1/2  of a pack a day -    Had difficulty getting the medication.  Needed a prior Auth.  We changed over to injectable testosterone.      Current dose is 50 mg subcu weekly.  They are self injecting sq    They are getting voice changes and bottom growth    They like it    They are comfortable with where they are with regards to changes and would like to keep the dose the same    Gender Surgeries - none, but interested in top surgery        Fertility concerns - IUD            Social:  Work -  Self employed -    Family -   Supportive   Relationship, orientation - has a casual sexual relationship with men and women  -  Housing - lives with  roomate       Anxiety and depression are stable - on meds      Has migraines and is on meds - no changes with starting testosterone      ROS:   As above    Objective:     LMP  (Approximate) Comment: LMP: last week of March  BP Readings from Last 3 Encounters:   07/21/22 110/64   05/26/22 100/70   02/24/22 115/70     Wt Readings from Last 1 Encounters:   07/21/22 1630 81.2 kg (179 lb 0.2 oz)     There is no height or weight on file to calculate BMI.      Physical Exam  Constitutional:       Appearance: Normal appearance.   Psychiatric:         Attention and Perception: Attention normal.         Mood and Affect: Mood normal.         Speech: Speech normal.         Behavior: Behavior normal.         Thought Content: Thought content normal.         Judgment: Judgment normal.              Lab none          Assessment and Plan     nonbinary (gender fluid) (they/them)    Transman: gender incongruence   Reviewed therapy, side effects (both wanted and unwanted), possible adverse outcomes, expectations, compliance.          Will continue Testosterone replacement therapy 50 mg q 1 week      RTC in 12  months with labs   Noting  Nl hh/ for men is:     Hemoglobin 14.0-18.0 g/dL    Hematocrit 40.0-54.0 %              The patient location is: home  The chief complaint leading to consultation is: gender    Visit type: audiovisual    Face to Face time with patient: 20 minutes of total time spent on the encounter, which includes face to face time and non-face to face time preparing to see the patient (eg, review of tests), Obtaining and/or reviewing separately obtained history, Documenting clinical information in the electronic or other health record, Independently interpreting results (not separately reported) and communicating results to the patient/family/caregiver, or Care coordination (not separately reported).         Each patient to whom he or she provides medical services by telemedicine is:  (1) informed of the relationship between the physician and patient and the respective role of any other health care provider with respect to management of the patient; and (2) notified that he or she may decline to receive medical services by telemedicine and may withdraw from such care at any time.

## 2023-04-19 ENCOUNTER — LAB VISIT (OUTPATIENT)
Dept: LAB | Facility: HOSPITAL | Age: 28
End: 2023-04-19
Attending: INTERNAL MEDICINE
Payer: MEDICAID

## 2023-04-19 DIAGNOSIS — Z79.899 TRANSGENDER PERSON ON HORMONE THERAPY: ICD-10-CM

## 2023-04-19 DIAGNOSIS — F64.0 TRANSGENDER PERSON ON HORMONE THERAPY: ICD-10-CM

## 2023-04-19 LAB
ALBUMIN SERPL BCP-MCNC: 4.3 G/DL (ref 3.5–5.2)
ALP SERPL-CCNC: 49 U/L (ref 55–135)
ALT SERPL W/O P-5'-P-CCNC: 16 U/L (ref 10–44)
ANION GAP SERPL CALC-SCNC: 9 MMOL/L (ref 8–16)
AST SERPL-CCNC: 18 U/L (ref 10–40)
BILIRUB SERPL-MCNC: 0.6 MG/DL (ref 0.1–1)
BUN SERPL-MCNC: 9 MG/DL (ref 6–20)
CALCIUM SERPL-MCNC: 9.7 MG/DL (ref 8.7–10.5)
CHLORIDE SERPL-SCNC: 102 MMOL/L (ref 95–110)
CO2 SERPL-SCNC: 28 MMOL/L (ref 23–29)
CREAT SERPL-MCNC: 0.8 MG/DL (ref 0.5–1.4)
ERYTHROCYTE [DISTWIDTH] IN BLOOD BY AUTOMATED COUNT: 11.9 % (ref 11.5–14.5)
EST. GFR  (NO RACE VARIABLE): >60 ML/MIN/1.73 M^2
GLUCOSE SERPL-MCNC: 82 MG/DL (ref 70–110)
HCT VFR BLD AUTO: 44.3 % (ref 37–48.5)
HGB BLD-MCNC: 14.8 G/DL (ref 12–16)
MCH RBC QN AUTO: 30.4 PG (ref 27–31)
MCHC RBC AUTO-ENTMCNC: 33.4 G/DL (ref 32–36)
MCV RBC AUTO: 91 FL (ref 82–98)
PLATELET # BLD AUTO: 245 K/UL (ref 150–450)
PMV BLD AUTO: 9.9 FL (ref 9.2–12.9)
POTASSIUM SERPL-SCNC: 4.9 MMOL/L (ref 3.5–5.1)
PROT SERPL-MCNC: 7.4 G/DL (ref 6–8.4)
RBC # BLD AUTO: 4.87 M/UL (ref 4–5.4)
SODIUM SERPL-SCNC: 139 MMOL/L (ref 136–145)
TESTOST SERPL-MCNC: 713 NG/DL (ref 5–73)
WBC # BLD AUTO: 4.89 K/UL (ref 3.9–12.7)

## 2023-04-19 PROCEDURE — 36415 COLL VENOUS BLD VENIPUNCTURE: CPT | Performed by: INTERNAL MEDICINE

## 2023-04-19 PROCEDURE — 84403 ASSAY OF TOTAL TESTOSTERONE: CPT | Performed by: INTERNAL MEDICINE

## 2023-04-19 PROCEDURE — 80053 COMPREHEN METABOLIC PANEL: CPT | Performed by: INTERNAL MEDICINE

## 2023-04-19 PROCEDURE — 85027 COMPLETE CBC AUTOMATED: CPT | Performed by: INTERNAL MEDICINE

## 2023-06-13 ENCOUNTER — TELEPHONE (OUTPATIENT)
Dept: ENDOCRINOLOGY | Facility: CLINIC | Age: 28
End: 2023-06-13
Payer: MEDICAID

## 2023-06-13 ENCOUNTER — PATIENT MESSAGE (OUTPATIENT)
Dept: ENDOCRINOLOGY | Facility: CLINIC | Age: 28
End: 2023-06-13
Payer: MEDICAID

## 2023-06-13 NOTE — TELEPHONE ENCOUNTER
----- Message from Laura Galindo MD sent at 6/12/2023  3:58 PM CDT -----  Regarding: RE: Referral needed  Contact: @ [phone]  They need a referral to speech  for voice training?  ----- Message -----  From: Lynne Neal MA  Sent: 6/12/2023   9:21 AM CDT  To: Laura Galindo MD  Subject: FW: Referral needed                                ----- Message -----  From: Virginia Jha  Sent: 6/9/2023   3:57 PM CDT  To: Josie Nova  Subject: Referral needed                                  Pt states that a referral is needed to another provider for insurance purposes. Referral is for a speech pathologist. Please call pt to discuss further.

## 2023-06-30 ENCOUNTER — TELEPHONE (OUTPATIENT)
Dept: ENDOCRINOLOGY | Facility: CLINIC | Age: 28
End: 2023-06-30
Payer: MEDICAID

## 2023-06-30 NOTE — TELEPHONE ENCOUNTER
----- Message from Estrella Franco MA sent at 6/29/2023  1:11 PM CDT -----  Regarding: FW: Pt Advice  Contact:  [phone]    ----- Message -----  From: Brigida Watkins  Sent: 6/29/2023  12:56 PM CDT  To: Josie ENGLISH Staff  Subject: Pt Advice                                        Pt is calling regarding referral for ( Speech Pathologist Referral ) Please Call.

## 2023-06-30 NOTE — TELEPHONE ENCOUNTER
Called to let pt know that the referral was sent over to the speech pathologist and suggested to pt to reach out to them to confirm receipt.

## 2023-08-20 ENCOUNTER — PATIENT MESSAGE (OUTPATIENT)
Dept: ENDOCRINOLOGY | Facility: CLINIC | Age: 28
End: 2023-08-20
Payer: MEDICAID

## 2023-08-25 ENCOUNTER — ON-DEMAND VIRTUAL (OUTPATIENT)
Dept: URGENT CARE | Facility: CLINIC | Age: 28
End: 2023-08-25
Payer: MEDICAID

## 2023-08-25 DIAGNOSIS — R30.0 DYSURIA: Primary | ICD-10-CM

## 2023-08-25 PROCEDURE — 99202 PR OFFICE/OUTPT VISIT, NEW, LEVL II, 15-29 MIN: ICD-10-PCS | Mod: 95,,, | Performed by: NURSE PRACTITIONER

## 2023-08-25 PROCEDURE — 99202 OFFICE O/P NEW SF 15 MIN: CPT | Mod: 95,,, | Performed by: NURSE PRACTITIONER

## 2023-08-25 RX ORDER — SULFAMETHOXAZOLE AND TRIMETHOPRIM 800; 160 MG/1; MG/1
1 TABLET ORAL 2 TIMES DAILY
Qty: 14 TABLET | Refills: 0 | Status: SHIPPED | OUTPATIENT
Start: 2023-08-25 | End: 2023-09-01

## 2023-08-26 NOTE — PROGRESS NOTES
"Subjective:      Patient ID: Tasneem Mario is a 27 y.o. female.    Vitals:  vitals were not taken for this visit.     Chief Complaint: Urinary Tract Infection      Visit Type: TELE AUDIOVISUAL    Present with the patient at the time of consultation: TELEMED PRESENT WITH PATIENT: None    Past Medical History:   Diagnosis Date    Migraine      Past Surgical History:   Procedure Laterality Date    TONSILLECTOMY       Review of patient's allergies indicates:  No Known Allergies  Current Outpatient Medications on File Prior to Visit   Medication Sig Dispense Refill    ALPRAZolam (XANAX) 0.5 MG tablet Take 0.5 mg by mouth daily as needed.      mirtazapine (REMERON) 15 MG tablet Take 15 mg by mouth nightly.      montelukast (SINGULAIR) 10 mg tablet SMARTSI Tablet(s) By Mouth Every Evening      naproxen (NAPROSYN) 500 MG tablet Take 1 tablet (500 mg total) by mouth 2 (two) times daily with meals. 30 tablet 1    pantoprazole (PROTONIX) 40 MG tablet Take 40 mg by mouth.      safety needles (BD SAFETYGLIDE NEEDLE) 25 gauge x 1" Ndle Used to inject testosterone 10 each 5    sertraline (ZOLOFT) 100 MG tablet Take 100 mg by mouth once daily.      syringe with needle, safety 3 mL 18 gauge x 1" Syrg Use to draw up testosterone 10 each 5    testosterone cypionate (DEPOTESTOTERONE CYPIONATE) 200 mg/mL injection Inject 0.25 mLs (50 mg total) into the muscle every 7 days. 3 ml syringe with 18 g needle  to draw  X 10 25 g 1 inch needle to inject x 10 2 mL 5    tretinoin (RETIN-A) 0.01 % gel        Current Facility-Administered Medications on File Prior to Visit   Medication Dose Route Frequency Provider Last Rate Last Admin    copper intrauterine device 380 square mm  mm  380 mm Intrauterine  Huong Steele NP   380 mm at 22 1300     Family History   Problem Relation Age of Onset    Diabetes Father     Hypertension Father        Medications Ordered                [id number] - NEW " [address] [facility] [address] [place] [id number], [facility] [address]    Telephone: [phone]   Fax: [phone]   Hours: Open 24 hours                         E-Prescribed (1 of 1)              sulfamethoxazole-trimethoprim 800-160mg (BACTRIM DS) 800-160 mg Tab    Sig: Take 1 tablet by mouth 2 (two) times daily. for 7 days       Start: 8/25/23     Quantity: 14 tablet Refills: 0                           Ohs Peq Odvv Intake    8/25/2023 11:14 PM CDT - Filed by Patient   Describe your reason for todays visit Potential bladder infection   What is your current physical address in the event of a medical emergency? [address]   Are you able to take your vital signs? No   Please attach any relevant images or files          Onset Tuesday. Reports frequency, no urgency and +dysuria. +back, pelvic and abdominal discomfort. +nausea(maybe migraine related). No f/c/v. ? Hematuria, currently menstruating. No vaginal discharge. No severe flank, or back pain. Prescribed Cipro by PCP but requesting alternate antibiotic to start treatment. Taking cranberry pills and OTC meds with little relief.       Urinary Tract Infection   Associated symptoms include frequency, hematuria (currently on menstrual cycle) and nausea. Pertinent negatives include no chills, flank pain, urgency or vomiting.       Constitution: Negative for chills and fever.   Gastrointestinal:  Positive for abdominal pain and nausea. Negative for vomiting.   Genitourinary:  Positive for dysuria, frequency, hematuria (currently on menstrual cycle) and pelvic pain. Negative for urgency, urine decreased, flank pain and vaginal discharge.   Musculoskeletal:  Positive for back pain.        Objective:   The physical exam was conducted virtually.  Physical Exam   Constitutional: Camilla Mario is oriented to person, place, and time. Camilla Mario does not appear ill. No distress.   HENT:    Head: Normocephalic and atraumatic.   Nose: Nose normal.   Eyes: Extraocular movement intact   Pulmonary/Chest: Effort normal.   Abdominal: Normal appearance.   Musculoskeletal: Normal range of motion.         General: Normal range of motion.   Neurological: no focal deficit. aCmilla Mario is alert and oriented to person, place, and time.   Psychiatric: Camilla Mario's behavior is normal. Mood normal.   Vitals reviewed.      Assessment:     1. Dysuria        Plan:   Patient encouraged to monitor symptoms closely and instructed to follow-up for new or worsening symptoms. Further, in-person, evaluation may be necessary for continued treatment. Please follow up with your primary care doctor or specialist as needed. Verbally discussed plan. Patient confirms understanding and is in agreement with treatment and plan.     You must understand that you've received a Virtual Care evaluation only and that you may be released before all your medical problems are known or treated. You, the patient, will arrange for follow up care as instructed.      Dysuria  -     sulfamethoxazole-trimethoprim 800-160mg (BACTRIM DS) 800-160 mg Tab; Take 1 tablet by mouth 2 (two) times daily. for 7 days  Dispense: 14 tablet; Refill: 0

## 2023-09-13 ENCOUNTER — OFFICE VISIT (OUTPATIENT)
Dept: OBSTETRICS AND GYNECOLOGY | Facility: CLINIC | Age: 28
End: 2023-09-13
Attending: OBSTETRICS & GYNECOLOGY
Payer: MEDICAID

## 2023-09-13 ENCOUNTER — LAB VISIT (OUTPATIENT)
Dept: LAB | Facility: OTHER | Age: 28
End: 2023-09-13
Attending: OBSTETRICS & GYNECOLOGY
Payer: MEDICAID

## 2023-09-13 VITALS — BODY MASS INDEX: 28.9 KG/M2 | WEIGHT: 184.5 LBS | SYSTOLIC BLOOD PRESSURE: 140 MMHG | DIASTOLIC BLOOD PRESSURE: 74 MMHG

## 2023-09-13 DIAGNOSIS — Z11.3 SCREENING FOR VENEREAL DISEASE: ICD-10-CM

## 2023-09-13 DIAGNOSIS — R35.0 URINARY FREQUENCY: Primary | ICD-10-CM

## 2023-09-13 DIAGNOSIS — B08.1 MOLLUSCUM CONTAGIOSUM: ICD-10-CM

## 2023-09-13 LAB
BACTERIA #/AREA URNS AUTO: ABNORMAL /HPF
BILIRUB UR QL STRIP: NEGATIVE
CLARITY UR REFRACT.AUTO: CLEAR
COLOR UR AUTO: YELLOW
GLUCOSE UR QL STRIP: NEGATIVE
HBV SURFACE AG SERPL QL IA: NORMAL
HGB UR QL STRIP: NEGATIVE
HIV 1+2 AB+HIV1 P24 AG SERPL QL IA: NORMAL
KETONES UR QL STRIP: NEGATIVE
LEUKOCYTE ESTERASE UR QL STRIP: ABNORMAL
MICROSCOPIC COMMENT: ABNORMAL
NITRITE UR QL STRIP: NEGATIVE
PH UR STRIP: 6 [PH] (ref 5–8)
PROT UR QL STRIP: NEGATIVE
RBC #/AREA URNS AUTO: 3 /HPF (ref 0–4)
RPR SER QL: NORMAL
SP GR UR STRIP: 1.01 (ref 1–1.03)
SQUAMOUS #/AREA URNS AUTO: 9 /HPF
URN SPEC COLLECT METH UR: ABNORMAL
WBC #/AREA URNS AUTO: 39 /HPF (ref 0–5)

## 2023-09-13 PROCEDURE — 3008F BODY MASS INDEX DOCD: CPT | Mod: CPTII,,, | Performed by: OBSTETRICS & GYNECOLOGY

## 2023-09-13 PROCEDURE — 99999 PR PBB SHADOW E&M-EST. PATIENT-LVL III: CPT | Mod: PBBFAC,,, | Performed by: OBSTETRICS & GYNECOLOGY

## 2023-09-13 PROCEDURE — 99213 OFFICE O/P EST LOW 20 MIN: CPT | Mod: S$PBB,,, | Performed by: OBSTETRICS & GYNECOLOGY

## 2023-09-13 PROCEDURE — 87340 HEPATITIS B SURFACE AG IA: CPT | Performed by: OBSTETRICS & GYNECOLOGY

## 2023-09-13 PROCEDURE — 81001 URINALYSIS AUTO W/SCOPE: CPT | Performed by: OBSTETRICS & GYNECOLOGY

## 2023-09-13 PROCEDURE — 99999 PR PBB SHADOW E&M-EST. PATIENT-LVL III: ICD-10-PCS | Mod: PBBFAC,,, | Performed by: OBSTETRICS & GYNECOLOGY

## 2023-09-13 PROCEDURE — 99213 OFFICE O/P EST LOW 20 MIN: CPT | Mod: PBBFAC | Performed by: OBSTETRICS & GYNECOLOGY

## 2023-09-13 PROCEDURE — 3078F PR MOST RECENT DIASTOLIC BLOOD PRESSURE < 80 MM HG: ICD-10-PCS | Mod: CPTII,,, | Performed by: OBSTETRICS & GYNECOLOGY

## 2023-09-13 PROCEDURE — 3077F PR MOST RECENT SYSTOLIC BLOOD PRESSURE >= 140 MM HG: ICD-10-PCS | Mod: CPTII,,, | Performed by: OBSTETRICS & GYNECOLOGY

## 2023-09-13 PROCEDURE — 3077F SYST BP >= 140 MM HG: CPT | Mod: CPTII,,, | Performed by: OBSTETRICS & GYNECOLOGY

## 2023-09-13 PROCEDURE — 1159F PR MEDICATION LIST DOCUMENTED IN MEDICAL RECORD: ICD-10-PCS | Mod: CPTII,,, | Performed by: OBSTETRICS & GYNECOLOGY

## 2023-09-13 PROCEDURE — 3078F DIAST BP <80 MM HG: CPT | Mod: CPTII,,, | Performed by: OBSTETRICS & GYNECOLOGY

## 2023-09-13 PROCEDURE — 87086 URINE CULTURE/COLONY COUNT: CPT | Performed by: OBSTETRICS & GYNECOLOGY

## 2023-09-13 PROCEDURE — 1160F PR REVIEW ALL MEDS BY PRESCRIBER/CLIN PHARMACIST DOCUMENTED: ICD-10-PCS | Mod: CPTII,,, | Performed by: OBSTETRICS & GYNECOLOGY

## 2023-09-13 PROCEDURE — 99213 PR OFFICE/OUTPT VISIT, EST, LEVL III, 20-29 MIN: ICD-10-PCS | Mod: S$PBB,,, | Performed by: OBSTETRICS & GYNECOLOGY

## 2023-09-13 PROCEDURE — 1160F RVW MEDS BY RX/DR IN RCRD: CPT | Mod: CPTII,,, | Performed by: OBSTETRICS & GYNECOLOGY

## 2023-09-13 PROCEDURE — 36415 COLL VENOUS BLD VENIPUNCTURE: CPT | Performed by: OBSTETRICS & GYNECOLOGY

## 2023-09-13 PROCEDURE — 3008F PR BODY MASS INDEX (BMI) DOCUMENTED: ICD-10-PCS | Mod: CPTII,,, | Performed by: OBSTETRICS & GYNECOLOGY

## 2023-09-13 PROCEDURE — 1159F MED LIST DOCD IN RCRD: CPT | Mod: CPTII,,, | Performed by: OBSTETRICS & GYNECOLOGY

## 2023-09-13 PROCEDURE — 86592 SYPHILIS TEST NON-TREP QUAL: CPT | Performed by: OBSTETRICS & GYNECOLOGY

## 2023-09-13 PROCEDURE — 87591 N.GONORRHOEAE DNA AMP PROB: CPT | Performed by: OBSTETRICS & GYNECOLOGY

## 2023-09-13 PROCEDURE — 87389 HIV-1 AG W/HIV-1&-2 AB AG IA: CPT | Performed by: OBSTETRICS & GYNECOLOGY

## 2023-09-13 RX ORDER — SINECATECHINS 150 MG/G
1 OINTMENT TOPICAL NIGHTLY
Qty: 30 G | Refills: 0 | Status: SHIPPED | OUTPATIENT
Start: 2023-09-13 | End: 2023-09-27

## 2023-09-13 NOTE — PROGRESS NOTES
"SUBJECTIVE:   27 y.o. female   for  gyn exam. Patient's last menstrual period was 2023..  She had c/o pain around vulvar and a tingling sensation.  Sx may have developed into a UTI. Had an e-visit  23 for these sx and given ABX. Sx went away.  But now has soreness around vulvar, and urinary frequency has since returned. Cranberry tablets has helped.  Also has some vulvar warts for about 2 weeks. Not painful. Two lesions have pus inside them, but not draining. She tried to drain it, but it wouldn't. Concerned about genital warts  Desires STD screen         Past Medical History:   Diagnosis Date    Migraine      Past Surgical History:   Procedure Laterality Date    TONSILLECTOMY       Social History     Socioeconomic History    Marital status: Single   Tobacco Use    Smoking status: Never    Smokeless tobacco: Never   Substance and Sexual Activity    Alcohol use: Yes     Comment: social     Drug use: Yes     Types: Marijuana    Sexual activity: Yes     Partners: Male     Comment: Partner/s sex assigned at birth is male     Family History   Problem Relation Age of Onset    Diabetes Father     Hypertension Father      OB History    Para Term  AB Living   0 0 0 0 0 0   SAB IAB Ectopic Multiple Live Births   0 0 0 0 0         Current Outpatient Medications   Medication Sig Dispense Refill    ALPRAZolam (XANAX) 0.5 MG tablet Take 0.5 mg by mouth daily as needed.      mirtazapine (REMERON) 15 MG tablet Take 15 mg by mouth nightly.      montelukast (SINGULAIR) 10 mg tablet SMARTSI Tablet(s) By Mouth Every Evening      naproxen (NAPROSYN) 500 MG tablet Take 1 tablet (500 mg total) by mouth 2 (two) times daily with meals. 30 tablet 1    pantoprazole (PROTONIX) 40 MG tablet Take 40 mg by mouth.      safety needles (BD SAFETYGLIDE NEEDLE) 25 gauge x 1" Ndle Used to inject testosterone 10 each 5    sertraline (ZOLOFT) 100 MG tablet Take 100 mg by mouth once daily.      syringe with needle, " "safety 3 mL 18 gauge x 1" Syrg Use to draw up testosterone 10 each 5    tretinoin (RETIN-A) 0.01 % gel       sinecatechins (VEREGEN) 15 % Oint Apply 1 Dose topically every evening. Apply at night. Wash off in morning for 14 days 30 g 0    testosterone cypionate (DEPOTESTOTERONE CYPIONATE) 200 mg/mL injection Inject 0.25 mLs (50 mg total) into the muscle every 7 days. 3 ml syringe with 18 g needle  to draw  X 10 25 g 1 inch needle to inject x 10 2 mL 5     Current Facility-Administered Medications   Medication Dose Route Frequency Provider Last Rate Last Admin    copper intrauterine device 380 square mm  mm  380 mm Intrauterine  Huong Steele NP   380 mm at 05/26/22 1300     Allergies: Patient has no known allergies.     ROS:  Constitutional: no weight loss, weight gain, fever, fatigue  Eyes:  No vision changes, glasses/contacts  ENT/Mouth: No ulcers, sinus problems, ears ringing, headache  Cardiovascular: No inability to lie flat, chest pain, exercise intolerance, swelling, heart palpitations  Respiratory: No wheezing, coughing blood, shortness of breath, or cough  Gastrointestinal: No diarrhea, bloody stool, nausea/vomiting, constipation, gas, hemorrhoids  Genitourinary: No blood in urine, painful urination, urgency of urination, +frequency of urination, incomplete emptying, incontinence, abnormal bleeding, painful periods, heavy periods, vaginal discharge, vaginal odor, painful intercourse, sexual problems, bleeding after intercourse.  Musculoskeletal: No muscle weakness  Skin/Breast: No painful breasts, nipple discharge, masses, rash, ulcers  Neurological: No passing out, seizures, numbness, headache  Endocrine: No diabetes, hypothyroid, hyperthyroid, hot flashes, hair loss, abnormal hair growth, acne  Psychiatric: No depression, crying  Hematologic: No bruises, bleeding, swollen lymph nodes, anemia.      OBJECTIVE:   The patient appears well, alert, oriented x 3, in no distress.  BP (!) 140/74   " Wt 83.7 kg (184 lb 8.4 oz)   LMP 08/25/2023   BMI 28.90 kg/m²   ABDOMEN: no hernias, masses, or hepatosplenomegaly  GENITALIA: normal external genitalia, no erythema, no discharge  Multiple shallow comedone raised lesions c/w molluscum,. Mostly right groin and vulva- scattered.  URETHRA: normal urethra, normal urethral meatus  VAGINA: Normal  CERVIX: no lesions or cervical motion tenderness. IUD strings  UTERUS: normal size, contour, position, consistency, mobility, non-tender  ADNEXA: no mass, fullness, tenderness    Physical Exam  Genitourinary:            ASSESSMENT:   1. Urinary frequency  Urinalysis    Urine culture      2. Screening for venereal disease  C. trachomatis/N. gonorrhoeae by AMP DNA    HIV 1/2 Ag/Ab (4th Gen)    RPR    Hepatitis B Surface Antigen      3. Molluscum contagiosum            PLAN:   Orders Placed This Encounter    Urine culture    C. trachomatis/N. gonorrhoeae by AMP DNA    Urinalysis    HIV 1/2 Ag/Ab (4th Gen)    RPR    Hepatitis B Surface Antigen    Urinalysis Microscopic    sinecatechins (VEREGEN) 15 % Oint     Discussed molluscum. Stop scratching. Topical veregen or aldara. Can also see dermatologist for in-office tx  Discussed sx and possible UTI. U/A C&S  Return to clinic prn

## 2023-09-14 LAB
C TRACH DNA SPEC QL NAA+PROBE: NOT DETECTED
N GONORRHOEA DNA SPEC QL NAA+PROBE: NOT DETECTED

## 2023-09-15 LAB
BACTERIA UR CULT: NORMAL
BACTERIA UR CULT: NORMAL

## 2023-09-26 ENCOUNTER — TELEPHONE (OUTPATIENT)
Dept: OBSTETRICS AND GYNECOLOGY | Facility: CLINIC | Age: 28
End: 2023-09-26
Payer: MEDICAID

## 2023-09-26 NOTE — TELEPHONE ENCOUNTER
----- Message from Albert Turcios sent at 9/26/2023  4:37 PM CDT -----  Patient called in stating she is still having UTI symptoms and would more test ran. Patient would like a call back to discuss.

## 2023-10-16 DIAGNOSIS — F64.0 TRANSGENDER PERSON ON HORMONE THERAPY: ICD-10-CM

## 2023-10-16 DIAGNOSIS — Z79.899 TRANSGENDER PERSON ON HORMONE THERAPY: ICD-10-CM

## 2023-10-17 NOTE — TELEPHONE ENCOUNTER
Called to schedule patient for appointment, no answer left message to return call to clinic, call back number provided.

## 2023-10-24 ENCOUNTER — TELEPHONE (OUTPATIENT)
Dept: ENDOCRINOLOGY | Facility: CLINIC | Age: 28
End: 2023-10-24
Payer: MEDICAID

## 2023-10-24 RX ORDER — TESTOSTERONE CYPIONATE 200 MG/ML
50 INJECTION, SOLUTION INTRAMUSCULAR
Qty: 10 ML | Refills: 1 | OUTPATIENT
Start: 2023-10-24 | End: 2023-11-23

## 2023-10-24 NOTE — TELEPHONE ENCOUNTER
Patient states he was prescribed Spirolactone by a dermatologist who work with trans patient who states patient take both Spirolactone and testosterone together, patient would like to know your thoughts on this.

## 2023-10-25 RX ORDER — TESTOSTERONE CYPIONATE 200 MG/ML
50 INJECTION, SOLUTION INTRAMUSCULAR
Qty: 4 ML | Refills: 0 | Status: SHIPPED | OUTPATIENT
Start: 2023-10-25 | End: 2024-03-21 | Stop reason: SDUPTHER

## 2024-02-07 ENCOUNTER — TELEPHONE (OUTPATIENT)
Dept: UROGYNECOLOGY | Facility: CLINIC | Age: 29
End: 2024-02-07
Payer: MEDICAID

## 2024-02-08 ENCOUNTER — OFFICE VISIT (OUTPATIENT)
Dept: UROGYNECOLOGY | Facility: CLINIC | Age: 29
End: 2024-02-08
Payer: MEDICAID

## 2024-02-08 VITALS
WEIGHT: 171.75 LBS | DIASTOLIC BLOOD PRESSURE: 75 MMHG | SYSTOLIC BLOOD PRESSURE: 115 MMHG | BODY MASS INDEX: 26.96 KG/M2 | HEART RATE: 69 BPM | HEIGHT: 67 IN

## 2024-02-08 DIAGNOSIS — N89.8 VAGINAL DISCHARGE: ICD-10-CM

## 2024-02-08 DIAGNOSIS — N30.00 ACUTE CYSTITIS WITHOUT HEMATURIA: ICD-10-CM

## 2024-02-08 DIAGNOSIS — N95.2 VAGINAL ATROPHY: ICD-10-CM

## 2024-02-08 DIAGNOSIS — R10.2 SUPRAPUBIC PAIN: ICD-10-CM

## 2024-02-08 DIAGNOSIS — R30.0 DYSURIA: ICD-10-CM

## 2024-02-08 DIAGNOSIS — Z01.419 WELL WOMAN EXAM: Primary | ICD-10-CM

## 2024-02-08 LAB
BILIRUB SERPL-MCNC: ABNORMAL MG/DL
BLOOD URINE, POC: ABNORMAL
CLARITY, POC UA: ABNORMAL
COLOR, POC UA: ABNORMAL
GLUCOSE UR QL STRIP: NEGATIVE
KETONES UR QL STRIP: ABNORMAL
LEUKOCYTE ESTERASE URINE, POC: ABNORMAL
NITRITE, POC UA: ABNORMAL
PH, POC UA: 5
PROTEIN, POC: NEGATIVE
SPECIFIC GRAVITY, POC UA: 1.01
UROBILINOGEN, POC UA: ABNORMAL

## 2024-02-08 PROCEDURE — 87088 URINE BACTERIA CULTURE: CPT | Performed by: NURSE PRACTITIONER

## 2024-02-08 PROCEDURE — 3008F BODY MASS INDEX DOCD: CPT | Mod: CPTII,,, | Performed by: NURSE PRACTITIONER

## 2024-02-08 PROCEDURE — 99999PBSHW POCT URINE DIPSTICK WITHOUT MICROSCOPE: Mod: PBBFAC,,,

## 2024-02-08 PROCEDURE — 1159F MED LIST DOCD IN RCRD: CPT | Mod: CPTII,,, | Performed by: NURSE PRACTITIONER

## 2024-02-08 PROCEDURE — 87186 SC STD MICRODIL/AGAR DIL: CPT | Performed by: NURSE PRACTITIONER

## 2024-02-08 PROCEDURE — 3078F DIAST BP <80 MM HG: CPT | Mod: CPTII,,, | Performed by: NURSE PRACTITIONER

## 2024-02-08 PROCEDURE — 1160F RVW MEDS BY RX/DR IN RCRD: CPT | Mod: CPTII,,, | Performed by: NURSE PRACTITIONER

## 2024-02-08 PROCEDURE — 87077 CULTURE AEROBIC IDENTIFY: CPT | Performed by: NURSE PRACTITIONER

## 2024-02-08 PROCEDURE — 3074F SYST BP LT 130 MM HG: CPT | Mod: CPTII,,, | Performed by: NURSE PRACTITIONER

## 2024-02-08 PROCEDURE — 87491 CHLMYD TRACH DNA AMP PROBE: CPT | Performed by: NURSE PRACTITIONER

## 2024-02-08 PROCEDURE — 87086 URINE CULTURE/COLONY COUNT: CPT | Performed by: NURSE PRACTITIONER

## 2024-02-08 PROCEDURE — 99214 OFFICE O/P EST MOD 30 MIN: CPT | Mod: PBBFAC | Performed by: NURSE PRACTITIONER

## 2024-02-08 PROCEDURE — 99395 PREV VISIT EST AGE 18-39: CPT | Mod: S$PBB,,, | Performed by: NURSE PRACTITIONER

## 2024-02-08 PROCEDURE — 81514 NFCT DS BV&VAGINITIS DNA ALG: CPT | Performed by: NURSE PRACTITIONER

## 2024-02-08 PROCEDURE — 99999 PR PBB SHADOW E&M-EST. PATIENT-LVL IV: CPT | Mod: PBBFAC,,, | Performed by: NURSE PRACTITIONER

## 2024-02-08 PROCEDURE — 81002 URINALYSIS NONAUTO W/O SCOPE: CPT | Mod: PBBFAC | Performed by: NURSE PRACTITIONER

## 2024-02-08 RX ORDER — PRIMIDONE 50 MG/1
50 TABLET ORAL
COMMUNITY

## 2024-02-08 RX ORDER — RIZATRIPTAN BENZOATE 10 MG/1
10 TABLET ORAL
COMMUNITY
Start: 2024-01-08

## 2024-02-08 RX ORDER — BENZONATATE 100 MG/1
100 CAPSULE ORAL 3 TIMES DAILY
COMMUNITY
Start: 2023-12-22 | End: 2024-02-08

## 2024-02-08 RX ORDER — MUPIROCIN 20 MG/G
OINTMENT TOPICAL 2 TIMES DAILY
COMMUNITY
Start: 2023-10-24 | End: 2024-02-08 | Stop reason: ALTCHOICE

## 2024-02-08 RX ORDER — NABUMETONE 750 MG/1
750 TABLET, FILM COATED ORAL 2 TIMES DAILY
COMMUNITY
Start: 2023-12-22 | End: 2024-02-08

## 2024-02-08 RX ORDER — CEPHALEXIN 500 MG/1
500 CAPSULE ORAL 2 TIMES DAILY
Qty: 14 CAPSULE | Refills: 0 | Status: SHIPPED | OUTPATIENT
Start: 2024-02-08 | End: 2024-02-12

## 2024-02-08 RX ORDER — KETOCONAZOLE 20 MG/G
CREAM TOPICAL 2 TIMES DAILY
COMMUNITY
Start: 2024-01-10 | End: 2024-02-08 | Stop reason: ALTCHOICE

## 2024-02-08 RX ORDER — AZELAIC ACID 0.15 G/G
GEL TOPICAL
COMMUNITY
Start: 2024-01-10

## 2024-02-08 RX ORDER — AZELASTINE 1 MG/ML
SPRAY, METERED NASAL
COMMUNITY
Start: 2023-10-17

## 2024-02-08 RX ORDER — SPIRONOLACTONE 50 MG/1
50 TABLET, FILM COATED ORAL
COMMUNITY
Start: 2024-02-07

## 2024-02-08 RX ORDER — ESTRADIOL 0.1 MG/G
CREAM VAGINAL
Qty: 42.5 G | Refills: 3 | Status: SHIPPED | OUTPATIENT
Start: 2024-02-08

## 2024-02-08 RX ORDER — LEVOCETIRIZINE DIHYDROCHLORIDE 5 MG/1
5 TABLET, FILM COATED ORAL NIGHTLY
COMMUNITY

## 2024-02-08 RX ORDER — ONABOTULINUMTOXINA 200 [USP'U]/1
INJECTION, POWDER, LYOPHILIZED, FOR SOLUTION INTRADERMAL; INTRAMUSCULAR
COMMUNITY
Start: 2023-12-01

## 2024-02-08 NOTE — PATIENT INSTRUCTIONS
1. Gyn care  --up to date    2. Contraception  --IUD in place  --gc/ct/ affirm today    3. Uti  --start vaginal estrogen cream  --urine culture today  --keflex 500 mg bid x 7 days    4. RTC 2 months for follow up virtual visit

## 2024-02-08 NOTE — PROGRESS NOTES
02/09/2024    SUBJECTIVE:   28 y.o. female for complaints of intermittent to constant suprapubic pressure 5-8/10 over the past 2 1/2 months.  Ice does improve. Heat does not.  Muscle relaxer helped  Pain did get worse 2 days after intercourse.  Does have dysruia-- uricalm helps that.    Denies constipation.     Goes by: Camilla  Pronouns: they/ them   Relationships: no  [place]: penetrative occasionally/ no anal     TG care:  --taking T injections 2 - 3 weeks  --followed by Dr. Colon     GOALS:  --deep voice/ bottom growth/ different fat distribution  -- top surgery--would like reduction vs. mastectomy  --hysterectomy and genital reconstruction;--possible hyst-- no genital reconstruction     GYN:  G0  --LMP 11/09/2021  --pelvic symptoms: denies pain  --no vaginal discharge, bleeding, dyspareunia : reports common yeast infection  --STIs: none  --vaginal dryness none  --last GYN exam 1-2 years ago     Psych:  --depression/ anxiety followed by Dr. Marcello Thurman  --taking meds zoloft  --takes remeron for vertigo, migraines,  and nausea  --migraine-- has floater aura, disorientation        Past Medical History:   Diagnosis Date    Migraine        Past Surgical History:   Procedure Laterality Date    TONSILLECTOMY         Family History   Problem Relation Age of Onset    Diabetes Father     Hypertension Father        Social History     Socioeconomic History    Marital status: Single   Tobacco Use    Smoking status: Never    Smokeless tobacco: Never   Substance and Sexual Activity    Alcohol use: Yes     Comment: social     Drug use: Yes     Types: Marijuana    Sexual activity: Yes     Partners: Male     Comment: Partner/s sex assigned at birth is male       Current Outpatient Medications   Medication Sig Dispense Refill    azelaic acid (AZELEX) 15 % gel Apply topically.      azelastine (ASTELIN) 137 mcg (0.1 %) nasal spray SMARTSIG:Both Nares      benzonatate (TESSALON) 100 MG capsule Take 100 mg by mouth 3 (three) times  "daily.      BOTOX 200 unit SolR SMARTSI Unit(s) IM Every 3 Months      ketoconazole (NIZORAL) 2 % cream Apply topically 2 (two) times daily.      levocetirizine (XYZAL) 5 MG tablet Take 5 mg by mouth every evening.      mirtazapine (REMERON) 15 MG tablet Take 15 mg by mouth nightly.      mupirocin (BACTROBAN) 2 % ointment Apply topically 2 (two) times daily.      nabumetone (RELAFEN) 750 MG tablet Take 750 mg by mouth 2 (two) times daily.      naproxen (NAPROSYN) 500 MG tablet Take 1 tablet (500 mg total) by mouth 2 (two) times daily with meals. 30 tablet 1    primidone (MYSOLINE) 50 MG Tab Take 50 mg by mouth.      rizatriptan (MAXALT) 10 MG tablet Take 10 mg by mouth.      safety needles (BD SAFETYGLIDE NEEDLE) 25 gauge x 1" Ndle Used to inject testosterone 10 each 5    sertraline (ZOLOFT) 100 MG tablet Take 100 mg by mouth once daily.      spironolactone (ALDACTONE) 50 MG tablet Take 50 mg by mouth.      syringe with needle, safety 3 mL 18 gauge x 1" Syrg Use to draw up testosterone 10 each 5    testosterone cypionate (DEPOTESTOTERONE CYPIONATE) 200 mg/mL injection Inject 0.25 mLs (50 mg total) into the muscle every 7 days. 3 ml syringe with 18 g needle  to draw  X 10 25 g 1 inch needle to inject x 10.  Dispose of a vial after each use 4 mL 0    ALPRAZolam (XANAX) 0.5 MG tablet Take 0.5 mg by mouth daily as needed.      cephALEXin (KEFLEX) 500 MG capsule Take 1 capsule (500 mg total) by mouth 2 (two) times daily. for 7 days 14 capsule 0    estradioL (ESTRACE) 0.01 % (0.1 mg/gram) vaginal cream Use 1 gm vaginal and 1 gm around vaginal opening nightly x 2 weeks then twice weekly 42.5 g 3    methen-m.blue-s.phos-phsal-hyo (URIBEL) 118-10-40.8-36 mg Cap Take 1 capsule by mouth 4 (four) times daily as needed (bladder pain). 120 capsule 11    montelukast (SINGULAIR) 10 mg tablet SMARTSI Tablet(s) By Mouth Every Evening      pantoprazole (PROTONIX) 40 MG tablet Take 40 mg by mouth.      tretinoin (RETIN-A) 0.01 " "% gel        Current Facility-Administered Medications   Medication Dose Route Frequency Provider Last Rate Last Admin    copper intrauterine device 380 square mm  mm  380 mm Intrauterine  Huong Steele, NP   380 mm at 22 1300       Review of patient's allergies indicates:  No Known Allergies    No LMP recorded. (Menstrual status: Birth Control).    Well Woman:  Pap:2021 normal      OB History          0    Para   0    Term   0       0    AB   0    Living   0         SAB   0    IAB   0    Ectopic   0    Multiple   0    Live Births   0                   ROS:  Feeling well.   No dyspnea or chest pain on exertion.    No abdominal pain, change in bowel habits, black or bloody stools.    Rare BEN + dysuria  GYN ROS: pelvic pain or discharge, no breast pain or new or enlarging lumps on self exam, she complains of PMDD. Has migraines with aura.   No neurological complaints.    OBJECTIVE:   The patient appears well, alert, oriented x 3, in no distress.  /75 (BP Location: Left arm, Patient Position: Sitting, BP Method: Medium (Automatic))   Pulse 69   Ht 5' 7" (1.702 m)   Wt 77.9 kg (171 lb 11.8 oz)   BMI 26.90 kg/m²   ENT normal.  Neck supple. No adenopathy or thyromegaly. ADOLFO.   Normal respiratory effort  Pulse with  regular rate and rhythm.   Abdomen soft without tenderness, guarding, mass or organomegaly.   Extremities show no edema, normal peripheral pulses.   Neurological is normal, no focal findings.    BREAST EXAM: breasts appear normal, no suspicious masses, no skin or nipple changes or axillary nodes    PELVIC EXAM:   VULVA: normal appearing vulva with no masses, tenderness or lesions, mild clitoromegaly  VAGINA: normal appearing vagina with normal color and discharge, no lesions,  CERVIX: normal appearing cervix without discharge or lesions, IUD stings visible  UTERUS: uterus is normal size, shape, consistency and nontender,   ADNEXA: normal adnexa in size, " nontender and no masses, no masses,   RECTAL: deferred    ASSESSMENT:   1. Well woman exam        2. Dysuria  Urine culture    methen-m.blue-s.phos-phsal-hyo (URIBEL) 118-10-40.8-36 mg Cap    estradioL (ESTRACE) 0.01 % (0.1 mg/gram) vaginal cream      3. Acute cystitis without hematuria  methen-m.blue-s.phos-phsal-hyo (URIBEL) 118-10-40.8-36 mg Cap    estradioL (ESTRACE) 0.01 % (0.1 mg/gram) vaginal cream      4. Vaginal atrophy  estradioL (ESTRACE) 0.01 % (0.1 mg/gram) vaginal cream      5. Vaginal discharge  C. trachomatis/N. gonorrhoeae by AMP DNA [facility]; Urine    Vaginosis Screen by DNA Probe      6. Suprapubic pain  POCT URINE DIPSTICK WITHOUT MICROSCOPE    cephALEXin (KEFLEX) 500 MG capsule          PLAN:   1. Gyn care  --up to date    2. Contraception  --IUD in place  --gc/ct/ affirm today    3. Uti  --start vaginal estrogen cream  --urine culture today  --keflex 500 mg bid x 7 days    4. RTC 2 months for follow up virtual visit        30 minutes were spent in face to face time with this patient  90 % of this time was spent in counseling and/or coordination of care    Huong CORONA [facility]and [facility]  Division of Female Pelvic Medicine and Reconstructive Surgery  Department of Obstetrics & Gynecology

## 2024-02-10 LAB
BACTERIA UR CULT: ABNORMAL
BACTERIAL VAGINOSIS DNA: POSITIVE
CANDIDA GLABRATA DNA: NEGATIVE
CANDIDA KRUSEI DNA: NEGATIVE
CANDIDA RRNA VAG QL PROBE: NEGATIVE
T VAGINALIS RRNA GENITAL QL PROBE: NEGATIVE

## 2024-02-12 ENCOUNTER — PATIENT MESSAGE (OUTPATIENT)
Dept: UROGYNECOLOGY | Facility: CLINIC | Age: 29
End: 2024-02-12
Payer: MEDICAID

## 2024-02-12 DIAGNOSIS — N30.00 ACUTE CYSTITIS WITHOUT HEMATURIA: Primary | ICD-10-CM

## 2024-02-12 DIAGNOSIS — R10.9 FLANK PAIN: ICD-10-CM

## 2024-02-12 DIAGNOSIS — R10.9 ABDOMINAL PAIN, UNSPECIFIED ABDOMINAL LOCATION: ICD-10-CM

## 2024-02-12 RX ORDER — SULFAMETHOXAZOLE AND TRIMETHOPRIM 800; 160 MG/1; MG/1
1 TABLET ORAL 2 TIMES DAILY
Qty: 10 TABLET | Refills: 0 | Status: SHIPPED | OUTPATIENT
Start: 2024-02-12 | End: 2024-04-09

## 2024-02-12 RX ORDER — SULFAMETHOXAZOLE AND TRIMETHOPRIM 800; 160 MG/1; MG/1
1 TABLET ORAL 2 TIMES DAILY
Qty: 6 TABLET | Refills: 0 | Status: SHIPPED | OUTPATIENT
Start: 2024-02-12 | End: 2024-02-12 | Stop reason: SDUPTHER

## 2024-02-13 ENCOUNTER — HOSPITAL ENCOUNTER (EMERGENCY)
Facility: HOSPITAL | Age: 29
Discharge: HOME | End: 2024-02-13
Attending: EMERGENCY MEDICINE
Payer: MEDICAID

## 2024-02-13 VITALS
HEART RATE: 87 BPM | OXYGEN SATURATION: 94 % | BODY MASS INDEX: 26.78 KG/M2 | SYSTOLIC BLOOD PRESSURE: 144 MMHG | TEMPERATURE: 100 F | WEIGHT: 171 LBS | DIASTOLIC BLOOD PRESSURE: 68 MMHG | RESPIRATION RATE: 15 BRPM

## 2024-02-13 DIAGNOSIS — N12 PYELONEPHRITIS: Primary | ICD-10-CM

## 2024-02-13 LAB
ALBUMIN SERPL BCP-MCNC: 3.9 G/DL (ref 3.5–5.2)
ALLENS TEST: ABNORMAL
ALLENS TEST: ABNORMAL
ALP SERPL-CCNC: 58 U/L (ref 55–135)
ALT SERPL W/O P-5'-P-CCNC: 13 U/L (ref 10–44)
ANION GAP SERPL CALC-SCNC: 9 MMOL/L (ref 8–16)
AST SERPL-CCNC: 18 U/L (ref 10–40)
B-HCG UR QL: NEGATIVE
BACTERIA #/AREA URNS AUTO: ABNORMAL /HPF
BASOPHILS # BLD AUTO: 0.02 K/UL (ref 0–0.2)
BASOPHILS NFR BLD: 0.2 % (ref 0–1.9)
BILIRUB SERPL-MCNC: 0.4 MG/DL (ref 0.1–1)
BILIRUB UR QL STRIP: NEGATIVE
BUN SERPL-MCNC: 12 MG/DL (ref 6–20)
CALCIUM SERPL-MCNC: 9.4 MG/DL (ref 8.7–10.5)
CHLORIDE SERPL-SCNC: 101 MMOL/L (ref 95–110)
CLARITY UR REFRACT.AUTO: CLEAR
CO2 SERPL-SCNC: 24 MMOL/L (ref 23–29)
COLOR UR AUTO: YELLOW
CREAT SERPL-MCNC: 1.2 MG/DL (ref 0.5–1.4)
CTP QC/QA: YES
DIFFERENTIAL METHOD BLD: ABNORMAL
EOSINOPHIL # BLD AUTO: 0 K/UL (ref 0–0.5)
EOSINOPHIL NFR BLD: 0.4 % (ref 0–8)
ERYTHROCYTE [DISTWIDTH] IN BLOOD BY AUTOMATED COUNT: 11.8 % (ref 11.5–14.5)
EST. GFR  (NO RACE VARIABLE): >60 ML/MIN/1.73 M^2
GLUCOSE SERPL-MCNC: 94 MG/DL (ref 70–110)
GLUCOSE UR QL STRIP: NEGATIVE
HCO3 UR-SCNC: 30.7 MMOL/L (ref 24–28)
HCT VFR BLD AUTO: 42.7 % (ref 37–48.5)
HGB BLD-MCNC: 14.4 G/DL (ref 12–16)
HGB UR QL STRIP: NEGATIVE
IMM GRANULOCYTES # BLD AUTO: 0.02 K/UL (ref 0–0.04)
IMM GRANULOCYTES NFR BLD AUTO: 0.2 % (ref 0–0.5)
INFLUENZA A, MOLECULAR: NEGATIVE
INFLUENZA B, MOLECULAR: NEGATIVE
KETONES UR QL STRIP: NEGATIVE
LDH SERPL L TO P-CCNC: 0.41 MMOL/L (ref 0.5–2.2)
LEUKOCYTE ESTERASE UR QL STRIP: ABNORMAL
LYMPHOCYTES # BLD AUTO: 1.4 K/UL (ref 1–4.8)
LYMPHOCYTES NFR BLD: 13.5 % (ref 18–48)
MCH RBC QN AUTO: 30.9 PG (ref 27–31)
MCHC RBC AUTO-ENTMCNC: 33.7 G/DL (ref 32–36)
MCV RBC AUTO: 92 FL (ref 82–98)
MICROSCOPIC COMMENT: ABNORMAL
MONOCYTES # BLD AUTO: 1.2 K/UL (ref 0.3–1)
MONOCYTES NFR BLD: 11.5 % (ref 4–15)
NEUTROPHILS # BLD AUTO: 7.6 K/UL (ref 1.8–7.7)
NEUTROPHILS NFR BLD: 74.2 % (ref 38–73)
NITRITE UR QL STRIP: NEGATIVE
NON-SQ EPI CELLS #/AREA URNS AUTO: 0 /HPF
NRBC BLD-RTO: 0 /100 WBC
PCO2 BLDA: 53.3 MMHG (ref 35–45)
PH SMN: 7.37 [PH] (ref 7.35–7.45)
PH UR STRIP: 6 [PH] (ref 5–8)
PLATELET # BLD AUTO: 206 K/UL (ref 150–450)
PMV BLD AUTO: 9.2 FL (ref 9.2–12.9)
PO2 BLDA: 20 MMHG (ref 40–60)
POC BE: 5 MMOL/L
POC SATURATED O2: 28 % (ref 95–100)
POC TCO2: 32 MMOL/L (ref 24–29)
POTASSIUM SERPL-SCNC: 4.1 MMOL/L (ref 3.5–5.1)
PROT SERPL-MCNC: 7.5 G/DL (ref 6–8.4)
PROT UR QL STRIP: NEGATIVE
RBC # BLD AUTO: 4.66 M/UL (ref 4–5.4)
RBC #/AREA URNS AUTO: 1 /HPF (ref 0–4)
SAMPLE: ABNORMAL
SAMPLE: ABNORMAL
SARS-COV-2 RDRP RESP QL NAA+PROBE: NEGATIVE
SITE: ABNORMAL
SITE: ABNORMAL
SODIUM SERPL-SCNC: 134 MMOL/L (ref 136–145)
SP GR UR STRIP: 1 (ref 1–1.03)
SPECIMEN SOURCE: NORMAL
SQUAMOUS #/AREA URNS AUTO: 1 /HPF
URN SPEC COLLECT METH UR: ABNORMAL
WBC # BLD AUTO: 10.22 K/UL (ref 3.9–12.7)
WBC #/AREA URNS AUTO: 32 /HPF (ref 0–5)

## 2024-02-13 PROCEDURE — 83605 ASSAY OF LACTIC ACID: CPT

## 2024-02-13 PROCEDURE — 80053 COMPREHEN METABOLIC PANEL: CPT | Performed by: EMERGENCY MEDICINE

## 2024-02-13 PROCEDURE — 96375 TX/PRO/DX INJ NEW DRUG ADDON: CPT

## 2024-02-13 PROCEDURE — 99900035 HC TECH TIME PER 15 MIN (STAT)

## 2024-02-13 PROCEDURE — 87502 INFLUENZA DNA AMP PROBE: CPT | Performed by: EMERGENCY MEDICINE

## 2024-02-13 PROCEDURE — 80048 BASIC METABOLIC PNL TOTAL CA: CPT | Mod: XB

## 2024-02-13 PROCEDURE — 87086 URINE CULTURE/COLONY COUNT: CPT | Performed by: EMERGENCY MEDICINE

## 2024-02-13 PROCEDURE — 81025 URINE PREGNANCY TEST: CPT | Performed by: EMERGENCY MEDICINE

## 2024-02-13 PROCEDURE — 99284 EMERGENCY DEPT VISIT MOD MDM: CPT | Mod: 25

## 2024-02-13 PROCEDURE — 96374 THER/PROPH/DIAG INJ IV PUSH: CPT

## 2024-02-13 PROCEDURE — 87040 BLOOD CULTURE FOR BACTERIA: CPT | Performed by: EMERGENCY MEDICINE

## 2024-02-13 PROCEDURE — 96361 HYDRATE IV INFUSION ADD-ON: CPT

## 2024-02-13 PROCEDURE — 63600175 PHARM REV CODE 636 W HCPCS: Performed by: EMERGENCY MEDICINE

## 2024-02-13 PROCEDURE — U0002 COVID-19 LAB TEST NON-CDC: HCPCS | Performed by: EMERGENCY MEDICINE

## 2024-02-13 PROCEDURE — 81001 URINALYSIS AUTO W/SCOPE: CPT | Performed by: EMERGENCY MEDICINE

## 2024-02-13 PROCEDURE — 82803 BLOOD GASES ANY COMBINATION: CPT

## 2024-02-13 PROCEDURE — 85025 COMPLETE CBC W/AUTO DIFF WBC: CPT | Performed by: EMERGENCY MEDICINE

## 2024-02-13 RX ORDER — ONDANSETRON HYDROCHLORIDE 2 MG/ML
4 INJECTION, SOLUTION INTRAVENOUS
Status: COMPLETED | OUTPATIENT
Start: 2024-02-13 | End: 2024-02-13

## 2024-02-13 RX ORDER — KETOROLAC TROMETHAMINE 30 MG/ML
10 INJECTION, SOLUTION INTRAMUSCULAR; INTRAVENOUS
Status: COMPLETED | OUTPATIENT
Start: 2024-02-13 | End: 2024-02-13

## 2024-02-13 RX ADMIN — ONDANSETRON 4 MG: 2 INJECTION INTRAMUSCULAR; INTRAVENOUS at 05:02

## 2024-02-13 RX ADMIN — KETOROLAC TROMETHAMINE 10 MG: 30 INJECTION, SOLUTION INTRAMUSCULAR; INTRAVENOUS at 05:02

## 2024-02-13 RX ADMIN — SODIUM CHLORIDE, POTASSIUM CHLORIDE, SODIUM LACTATE AND CALCIUM CHLORIDE 1000 ML: 600; 310; 30; 20 INJECTION, SOLUTION INTRAVENOUS at 05:02

## 2024-02-13 NOTE — DISCHARGE INSTRUCTIONS
Take the bactrim antibiotic as prescribed. Follow up with your urogyn physician. Return to the er with any new or worsening symptoms.

## 2024-02-13 NOTE — ED PROVIDER NOTES
History:  Tasneem Mario is a 28 y.o. female who presents to the ED with Low-back Pain (X few months, 1 week ago pain became more intense.dx with UTI on , finished abx today. ), Urinary Frequency, and Fatigue    Described as 29yo F with hx UTI and suprapubic pain x months. She recently saw her urogynecologist on 24 and was found to have a UTI, though she was unable to  the prescription. She took cipro at home that she had left over for 2 days without improvement and was called yesterday and the antibiotic was changed to bactrim, and she was instructed to go to the ER with any fevers. She took 1 dose of bactrim. Tonight, she developed a fever 100.4 at home with body aches and suprapubic and flank pain. Took aleve PTA. She also endorses mild URI symptoms and would like a flu test.     Review of Systems: All systems reviewed and are negative except as per history of present illness.    Medications:   Discharge Medication List as of 2024  5:55 AM        CONTINUE these medications which have NOT CHANGED    Details   ALPRAZolam (XANAX) 0.5 MG tablet Take 0.5 mg by mouth daily as needed., Starting Mon 2021, Historical Med      azelaic acid (AZELEX) 15 % gel Apply topically., Starting Wed 1/10/2024, Historical Med      azelastine (ASTELIN) 137 mcg (0.1 %) nasal spray SMARTSIG:Both Nares, Historical Med      BOTOX 200 unit SolR SMARTSI Unit(s) IM Every 3 Months, Historical Med      estradioL (ESTRACE) 0.01 % (0.1 mg/gram) vaginal cream Use 1 gm vaginal and 1 gm around vaginal opening nightly x 2 weeks then twice weekly, Normal      levocetirizine (XYZAL) 5 MG tablet Take 5 mg by mouth every evening., Historical Med      methen-m.blue-s.phos-phsal-hyo (URIBEL) 118-10-40.8-36 mg Cap Take 1 capsule by mouth 4 (four) times daily as needed (bladder pain)., Starting Thu 2024, Normal      mirtazapine (REMERON) 15 MG tablet Take 15 mg by mouth nightly., Starting Tue 10/26/2021,  "Historical Med      naproxen (NAPROSYN) 500 MG tablet Take 1 tablet (500 mg total) by mouth 2 (two) times daily with meals., Starting Thu 7/21/2022, Normal      primidone (MYSOLINE) 50 MG Tab Take 50 mg by mouth., Historical Med      rizatriptan (MAXALT) 10 MG tablet Take 10 mg by mouth., Starting Mon 1/8/2024, Historical Med      safety needles (BD SAFETYGLIDE NEEDLE) 25 gauge x 1" Ndle Used to inject testosterone, Normal      sertraline (ZOLOFT) 100 MG tablet Take 100 mg by mouth once daily., Starting Thu 10/21/2021, Historical Med      spironolactone (ALDACTONE) 50 MG tablet Take 50 mg by mouth., Starting Wed 2/7/2024, Historical Med      sulfamethoxazole-trimethoprim 800-160mg (BACTRIM DS) 800-160 mg Tab Take 1 tablet by mouth 2 (two) times daily., Starting Mon 2/12/2024, Normal      syringe with needle, safety 3 mL 18 gauge x 1" Syrg Use to draw up testosterone, Normal      testosterone cypionate (DEPOTESTOTERONE CYPIONATE) 200 mg/mL injection Inject 0.25 mLs (50 mg total) into the muscle every 7 days. 3 ml syringe with 18 g needle  to draw  X 10 25 g 1 inch needle to inject x 10.  Dispose of a vial after each use, Starting Wed 10/25/2023, Until Thu 2/8/2024, Normal      tretinoin (RETIN-A) 0.01 % gel Starting Tue 12/27/2022, Historical Med             PMH:   Past Medical History:   Diagnosis Date    Migraine      PSH:   Past Surgical History:   Procedure Laterality Date    TONSILLECTOMY       Allergies: Camilla Kerri Sheyenne has No Known Allergies.  Social History: Marital Status: single. Camilla Mario  reports that Camilla Mario has never smoked. Camilla Mario has never used smokeless tobacco.. Camilla Mario  reports current alcohol use..       Exam:  VITAL SIGNS:   Vitals:    02/13/24 0214   BP: (!) 144/68   BP Location: Right arm   Patient Position: Sitting   Pulse: 87   Resp: 15   Temp: 99.8 °F (37.7 °C)   TempSrc: Oral   SpO2: (!) 94%   Weight: 77.6 kg (171 lb) "     Const: Awake and alert, NAD   Head: Atraumatic  Eyes: Normal Conjunctiva  ENT: Normal External Ears, Nose and Mouth.  Neck: Full range of motion. No meningismus.  Resp: Normal respiratory effort, No distress, CTAB  Cardio: Equal and intact distal pulses, RRR  Abd: Soft, non tender, non distended. No CVA TTP   Skin: No petechiae or rashes  Ext: No cyanosis, or edema  Neur: Awake and alert  Psych: Normal Mood and Affect    Data:  Results for orders placed or performed during the hospital encounter of 02/13/24   Blood culture x two cultures. Draw prior to antibiotics.    Specimen: Peripheral, Antecubital, Left; Blood   Result Value Ref Range    Blood Culture, Routine No Growth to date    Blood culture x two cultures. Draw prior to antibiotics.    Specimen: Peripheral, Antecubital, Right; Blood   Result Value Ref Range    Blood Culture, Routine No Growth to date    Influenza A & B by Molecular    Specimen: Nasopharyngeal Swab   Result Value Ref Range    Influenza A, Molecular Negative Negative    Influenza B, Molecular Negative Negative    Flu A & B Source NP    Urinalysis, Reflex to Urine Culture Urine, Clean Catch    Specimen: Urine   Result Value Ref Range    Specimen UA Urine, Clean Catch     Color, UA Yellow Yellow, Straw, Francia    Appearance, UA Clear Clear    pH, UA 6.0 5.0 - 8.0    Specific Gravity, UA 1.005 1.005 - 1.030    Protein, UA Negative Negative    Glucose, UA Negative Negative    Ketones, UA Negative Negative    Bilirubin (UA) Negative Negative    Occult Blood UA Negative Negative    Nitrite, UA Negative Negative    Leukocytes, UA 2+ (A) Negative   Urinalysis Microscopic   Result Value Ref Range    RBC, UA 1 0 - 4 /hpf    WBC, UA 32 (H) 0 - 5 /hpf    Bacteria Moderate (A) None-Occ /hpf    Squam Epithel, UA 1 /hpf    Non-Squam Epith 0 <1/hpf /hpf    Microscopic Comment SEE COMMENT    CBC auto differential   Result Value Ref Range    WBC 10.22 3.90 - 12.70 K/uL    RBC 4.66 4.00 - 5.40 M/uL     Hemoglobin 14.4 12.0 - 16.0 g/dL    Hematocrit 42.7 37.0 - 48.5 %    MCV 92 82 - 98 fL    MCH 30.9 27.0 - 31.0 pg    MCHC 33.7 32.0 - 36.0 g/dL    RDW 11.8 11.5 - 14.5 %    Platelets 206 150 - 450 K/uL    MPV 9.2 9.2 - 12.9 fL    Immature Granulocytes 0.2 0.0 - 0.5 %    Gran # (ANC) 7.6 1.8 - 7.7 K/uL    Immature Grans (Abs) 0.02 0.00 - 0.04 K/uL    Lymph # 1.4 1.0 - 4.8 K/uL    Mono # 1.2 (H) 0.3 - 1.0 K/uL    Eos # 0.0 0.0 - 0.5 K/uL    Baso # 0.02 0.00 - 0.20 K/uL    nRBC 0 0 /100 WBC    Gran % 74.2 (H) 38.0 - 73.0 %    Lymph % 13.5 (L) 18.0 - 48.0 %    Mono % 11.5 4.0 - 15.0 %    Eosinophil % 0.4 0.0 - 8.0 %    Basophil % 0.2 0.0 - 1.9 %    Differential Method Automated    Comprehensive metabolic panel   Result Value Ref Range    Sodium 134 (L) 136 - 145 mmol/L    Potassium 4.1 3.5 - 5.1 mmol/L    Chloride 101 95 - 110 mmol/L    CO2 24 23 - 29 mmol/L    Glucose 94 70 - 110 mg/dL    BUN 12 6 - 20 mg/dL    Creatinine 1.2 0.5 - 1.4 mg/dL    Calcium 9.4 8.7 - 10.5 mg/dL    Total Protein 7.5 6.0 - 8.4 g/dL    Albumin 3.9 3.5 - 5.2 g/dL    Total Bilirubin 0.4 0.1 - 1.0 mg/dL    Alkaline Phosphatase 58 55 - 135 U/L    AST 18 10 - 40 U/L    ALT 13 10 - 44 U/L    eGFR >60.0 >60 mL/min/1.73 m^2    Anion Gap 9 8 - 16 mmol/L   COVID-19 Rapid Screening   Result Value Ref Range    SARS-CoV-2 RNA, Amplification, Qual Negative Negative   POCT urine pregnancy   Result Value Ref Range    POC Preg Test, Ur Negative Negative     Acceptable Yes    ISTAT Lactate   Result Value Ref Range    POC Lactate 0.41 (L) 0.5 - 2.2 mmol/L    Sample VENOUS     Site Other     Allens Test N/A    ISTAT PROCEDURE   Result Value Ref Range    POC PH 7.369 7.35 - 7.45    POC PCO2 53.3 (H) 35 - 45 mmHg    POC PO2 20 (LL) 40 - 60 mmHg    POC HCO3 30.7 (H) 24 - 28 mmol/L    POC BE 5 (H) -2 to 2 mmol/L    POC SATURATED O2 28 95 - 100 %    POC TCO2 32 (H) 24 - 29 mmol/L    Sample VENOUS     Site Other     Allens Test N/A          Labs &  "Imaging studies were reviewed independently by me.     Medical Decision Makin-year-old female presenting to the emergency department with a fever in the setting of down UTI, only taking 1 antibiotic pill that is sensitive since her diagnosis.  Her abdominal examination is benign. Doubt cholecystitis, appendicitis, pancreatitis, SBO, diverticulitis, or any other acute abdominal surgical emergency.  She has URI symptoms, though COVID and flu tests are found to be negative.  She was well-appearing on my evaluation.  She was given fluids, Toradol, and Zofran with significant improvement in symptoms.  Laboratory studies are generally unremarkable, lactic acid was found to be normal.  She continues to have UTI likely due to incomplete treatment.  She was encouraged to take her Bactrim medication as prescribed and to return to the ER with any new or worsening symptoms, follow up with urogyn.    Clinical Impression:  1. Pyelonephritis             Medication List        ASK your doctor about these medications      ALPRAZolam 0.5 MG tablet  Commonly known as: XANAX     azelaic acid 15 % gel  Commonly known as: AZELEX     azelastine 137 mcg (0.1 %) nasal spray  Commonly known as: ASTELIN     BD SAFETYGLIDE NEEDLE 25 gauge x 1" Ndle  Generic drug: safety needles  Used to inject testosterone     BOTOX 200 unit Solr  Generic drug: onabotulinumtoxina     estradioL 0.01 % (0.1 mg/gram) vaginal cream  Commonly known as: ESTRACE  Use 1 gm vaginal and 1 gm around vaginal opening nightly x 2 weeks then twice weekly     levocetirizine 5 MG tablet  Commonly known as: XYZAL     methen-m.blue-s.phos-phsal-hyo 118-10-40.8-36 mg Cap  Commonly known as: URIBEL  Take 1 capsule by mouth 4 (four) times daily as needed (bladder pain).     mirtazapine 15 MG tablet  Commonly known as: REMERON     naproxen 500 MG tablet  Commonly known as: NAPROSYN  Take 1 tablet (500 mg total) by mouth 2 (two) times daily with meals.     primidone 50 MG " "Tab  Commonly known as: MYSOLINE     rizatriptan 10 MG tablet  Commonly known as: MAXALT     sertraline 100 MG tablet  Commonly known as: ZOLOFT     spironolactone 50 MG tablet  Commonly known as: ALDACTONE     sulfamethoxazole-trimethoprim 800-160mg 800-160 mg Tab  Commonly known as: BACTRIM DS  Take 1 tablet by mouth 2 (two) times daily.     syringe with needle, safety 3 mL 18 gauge x 1" Syrg  Use to draw up testosterone     testosterone cypionate 200 mg/mL injection  Commonly known as: DEPOTESTOTERONE CYPIONATE  Inject 0.25 mLs (50 mg total) into the muscle every 7 days. 3 ml syringe with 18 g needle  to draw  X 10 25 g 1 inch needle to inject x 10.  Dispose of a vial after each use     tretinoin 0.01 % gel  Commonly known as: RETIN-A              Follow-up Information       Regina Denson MD.    Specialty: Internal Medicine  Contact information:  [address] [facility] [id number] [phone]                               Jenny Rubin MD  02/13/24 2142    "

## 2024-02-14 ENCOUNTER — NURSE TRIAGE (OUTPATIENT)
Dept: ADMINISTRATIVE | Facility: CLINIC | Age: 29
End: 2024-02-14
Payer: MEDICAID

## 2024-02-14 ENCOUNTER — HOSPITAL ENCOUNTER (OUTPATIENT)
Dept: RADIOLOGY | Facility: HOSPITAL | Age: 29
Discharge: HOME | End: 2024-02-14
Attending: NURSE PRACTITIONER
Payer: MEDICAID

## 2024-02-14 ENCOUNTER — HOSPITAL ENCOUNTER (EMERGENCY)
Facility: HOSPITAL | Age: 29
Discharge: HOME | End: 2024-02-14
Attending: EMERGENCY MEDICINE
Payer: MEDICAID

## 2024-02-14 VITALS
WEIGHT: 175 LBS | OXYGEN SATURATION: 98 % | TEMPERATURE: 98 F | BODY MASS INDEX: 27.41 KG/M2 | HEART RATE: 77 BPM | DIASTOLIC BLOOD PRESSURE: 69 MMHG | SYSTOLIC BLOOD PRESSURE: 118 MMHG | RESPIRATION RATE: 16 BRPM

## 2024-02-14 DIAGNOSIS — R10.9 ABDOMINAL PAIN, UNSPECIFIED ABDOMINAL LOCATION: ICD-10-CM

## 2024-02-14 DIAGNOSIS — N12 PYELONEPHRITIS: Primary | ICD-10-CM

## 2024-02-14 DIAGNOSIS — R10.9 FLANK PAIN: ICD-10-CM

## 2024-02-14 LAB
ALBUMIN SERPL BCP-MCNC: 3.7 G/DL (ref 3.5–5.2)
ALP SERPL-CCNC: 52 U/L (ref 55–135)
ALT SERPL W/O P-5'-P-CCNC: 14 U/L (ref 10–44)
ANION GAP SERPL CALC-SCNC: 6 MMOL/L (ref 8–16)
AST SERPL-CCNC: 19 U/L (ref 10–40)
B-HCG UR QL: NEGATIVE
BACTERIA #/AREA URNS AUTO: NORMAL /HPF
BACTERIA UR CULT: NORMAL
BASOPHILS # BLD AUTO: 0.03 K/UL (ref 0–0.2)
BASOPHILS NFR BLD: 0.4 % (ref 0–1.9)
BILIRUB SERPL-MCNC: 0.3 MG/DL (ref 0.1–1)
BILIRUB UR QL STRIP: NEGATIVE
BUN SERPL-MCNC: 14 MG/DL (ref 6–20)
CALCIUM SERPL-MCNC: 9.6 MG/DL (ref 8.7–10.5)
CHLORIDE SERPL-SCNC: 102 MMOL/L (ref 95–110)
CLARITY UR REFRACT.AUTO: CLEAR
CO2 SERPL-SCNC: 30 MMOL/L (ref 23–29)
COLOR UR AUTO: YELLOW
CREAT SERPL-MCNC: 1.1 MG/DL (ref 0.5–1.4)
CTP QC/QA: YES
DIFFERENTIAL METHOD BLD: ABNORMAL
EOSINOPHIL # BLD AUTO: 0.1 K/UL (ref 0–0.5)
EOSINOPHIL NFR BLD: 0.9 % (ref 0–8)
ERYTHROCYTE [DISTWIDTH] IN BLOOD BY AUTOMATED COUNT: 11.9 % (ref 11.5–14.5)
EST. GFR  (NO RACE VARIABLE): >60 ML/MIN/1.73 M^2
GLUCOSE SERPL-MCNC: 90 MG/DL (ref 70–110)
GLUCOSE UR QL STRIP: NEGATIVE
HCT VFR BLD AUTO: 40.8 % (ref 37–48.5)
HGB BLD-MCNC: 13.9 G/DL (ref 12–16)
HGB UR QL STRIP: NEGATIVE
IMM GRANULOCYTES # BLD AUTO: 0.02 K/UL (ref 0–0.04)
IMM GRANULOCYTES NFR BLD AUTO: 0.3 % (ref 0–0.5)
KETONES UR QL STRIP: NEGATIVE
LACTATE SERPL-SCNC: 0.6 MMOL/L (ref 0.5–2.2)
LEUKOCYTE ESTERASE UR QL STRIP: ABNORMAL
LIPASE SERPL-CCNC: 18 U/L (ref 4–60)
LYMPHOCYTES # BLD AUTO: 1.5 K/UL (ref 1–4.8)
LYMPHOCYTES NFR BLD: 19.3 % (ref 18–48)
MCH RBC QN AUTO: 31.4 PG (ref 27–31)
MCHC RBC AUTO-ENTMCNC: 34.1 G/DL (ref 32–36)
MCV RBC AUTO: 92 FL (ref 82–98)
MICROSCOPIC COMMENT: NORMAL
MONOCYTES # BLD AUTO: 1.2 K/UL (ref 0.3–1)
MONOCYTES NFR BLD: 15.1 % (ref 4–15)
NEUTROPHILS # BLD AUTO: 5 K/UL (ref 1.8–7.7)
NEUTROPHILS NFR BLD: 64 % (ref 38–73)
NITRITE UR QL STRIP: NEGATIVE
NRBC BLD-RTO: 0 /100 WBC
PH UR STRIP: 7 [PH] (ref 5–8)
PLATELET # BLD AUTO: 216 K/UL (ref 150–450)
PMV BLD AUTO: 9.5 FL (ref 9.2–12.9)
POTASSIUM SERPL-SCNC: 4.8 MMOL/L (ref 3.5–5.1)
PROT SERPL-MCNC: 7.5 G/DL (ref 6–8.4)
PROT UR QL STRIP: NEGATIVE
RBC # BLD AUTO: 4.42 M/UL (ref 4–5.4)
RBC #/AREA URNS AUTO: 1 /HPF (ref 0–4)
SODIUM SERPL-SCNC: 138 MMOL/L (ref 136–145)
SP GR UR STRIP: 1.01 (ref 1–1.03)
SQUAMOUS #/AREA URNS AUTO: 3 /HPF
URN SPEC COLLECT METH UR: ABNORMAL
WBC # BLD AUTO: 7.76 K/UL (ref 3.9–12.7)
WBC #/AREA URNS AUTO: 4 /HPF (ref 0–5)

## 2024-02-14 PROCEDURE — 63600175 PHARM REV CODE 636 W HCPCS: Performed by: PHYSICIAN ASSISTANT

## 2024-02-14 PROCEDURE — 96374 THER/PROPH/DIAG INJ IV PUSH: CPT

## 2024-02-14 PROCEDURE — 96375 TX/PRO/DX INJ NEW DRUG ADDON: CPT

## 2024-02-14 PROCEDURE — 99284 EMERGENCY DEPT VISIT MOD MDM: CPT | Mod: 25

## 2024-02-14 PROCEDURE — 81025 URINE PREGNANCY TEST: CPT | Performed by: EMERGENCY MEDICINE

## 2024-02-14 PROCEDURE — 74176 CT ABD & PELVIS W/O CONTRAST: CPT | Mod: 26,,, | Performed by: RADIOLOGY

## 2024-02-14 PROCEDURE — 83605 ASSAY OF LACTIC ACID: CPT | Performed by: EMERGENCY MEDICINE

## 2024-02-14 PROCEDURE — 83690 ASSAY OF LIPASE: CPT | Performed by: PHYSICIAN ASSISTANT

## 2024-02-14 PROCEDURE — 80053 COMPREHEN METABOLIC PANEL: CPT | Performed by: EMERGENCY MEDICINE

## 2024-02-14 PROCEDURE — 25000003 PHARM REV CODE 250: Performed by: PHYSICIAN ASSISTANT

## 2024-02-14 PROCEDURE — 96361 HYDRATE IV INFUSION ADD-ON: CPT

## 2024-02-14 PROCEDURE — 85025 COMPLETE CBC W/AUTO DIFF WBC: CPT | Performed by: EMERGENCY MEDICINE

## 2024-02-14 PROCEDURE — 81001 URINALYSIS AUTO W/SCOPE: CPT | Performed by: EMERGENCY MEDICINE

## 2024-02-14 PROCEDURE — 74176 CT ABD & PELVIS W/O CONTRAST: CPT | Mod: TC

## 2024-02-14 RX ORDER — ONDANSETRON HYDROCHLORIDE 2 MG/ML
4 INJECTION, SOLUTION INTRAVENOUS
Status: COMPLETED | OUTPATIENT
Start: 2024-02-14 | End: 2024-02-14

## 2024-02-14 RX ORDER — MORPHINE SULFATE 4 MG/ML
4 INJECTION, SOLUTION INTRAMUSCULAR; INTRAVENOUS
Status: COMPLETED | OUTPATIENT
Start: 2024-02-14 | End: 2024-02-14

## 2024-02-14 RX ORDER — HYDROCODONE BITARTRATE AND ACETAMINOPHEN 10; 325 MG/1; MG/1
1 TABLET ORAL EVERY 6 HOURS PRN
Qty: 12 TABLET | Refills: 0 | Status: SHIPPED | OUTPATIENT
Start: 2024-02-14

## 2024-02-14 RX ORDER — KETOROLAC TROMETHAMINE 30 MG/ML
15 INJECTION, SOLUTION INTRAMUSCULAR; INTRAVENOUS
Status: COMPLETED | OUTPATIENT
Start: 2024-02-14 | End: 2024-02-14

## 2024-02-14 RX ORDER — ONDANSETRON 4 MG/1
4 TABLET, ORALLY DISINTEGRATING ORAL EVERY 6 HOURS PRN
Qty: 12 TABLET | Refills: 0 | Status: SHIPPED | OUTPATIENT
Start: 2024-02-14

## 2024-02-14 RX ADMIN — SODIUM CHLORIDE 1000 ML: 9 INJECTION, SOLUTION INTRAVENOUS at 02:02

## 2024-02-14 RX ADMIN — ONDANSETRON 4 MG: 2 INJECTION INTRAMUSCULAR; INTRAVENOUS at 04:02

## 2024-02-14 RX ADMIN — KETOROLAC TROMETHAMINE 15 MG: 30 INJECTION, SOLUTION INTRAMUSCULAR; INTRAVENOUS at 02:02

## 2024-02-14 RX ADMIN — MORPHINE SULFATE 4 MG: 4 INJECTION INTRAVENOUS at 04:02

## 2024-02-14 NOTE — ED NOTES
Patient identifiers verified and correct for [facility]    LOC: The patient is awake, alert and aware of environment with an appropriate affect, the patient is oriented x 3 and speaking appropriately.   APPEARANCE: Patient appears comfortable and in no acute distress, patient is clean and well groomed.  SKIN: The skin is warm and dry, color consistent with ethnicity, patient has normal skin turgor and moist mucus membranes, skin intact, no breakdown or bruising noted.   MUSCULOSKELETAL: Patient moving all extremities spontaneously, no swelling noted.  RESPIRATORY: Airway is open and patent, respirations are spontaneous, patient has a normal effort and rate, no accessory muscle use noted, pt placed on pulse ox  SPO2 noted at 99% on room air.  CARDIAC: Patient has a normal rate, no edema noted, capillary refill < 3 seconds.   GASTRO: Soft and non tender to palpation, no distention noted, normoactive bowel sounds present in all four quadrants. Pt states bowel movements have been regular.  : Pt denies any pain or frequency with urination.  NEURO: Pt opens eyes spontaneously, behavior appropriate to situation, follows commands, facial expression symmetrical, bilateral hand grasp equal and even, purposeful motor response noted.  PAIN:  8/10

## 2024-02-14 NOTE — TELEPHONE ENCOUNTER
Diagnosed with kidney infection in ER yesterday. Transferred to O nurse after being unable to reach Dr. Steele's clinic. Was having side pain initially during visit, now pain has radiated to upper abdomen, rates 6-7/10. Was started on abx last week by clinic, another abx given in ER, has had ab 4-5 doses. No fever. Denies difficulty urinating. Last BM on Tuesday morning.     Dispo-ER now for eval. Pt vu.     Reason for Disposition   SEVERE abdominal pain (e.g., excruciating)    Additional Information   Negative: SEVERE difficulty breathing (e.g., struggling for each breath, speaks in single words)   Negative: Shock suspected (e.g., cold/pale/clammy skin, too weak to stand, low BP, rapid pulse)   Negative: Difficult to awaken or acting confused (e.g., disoriented, slurred speech)   Negative: Passed out (i.e., lost consciousness, collapsed and was not responding)   Negative: Visible sweat on face or sweat is dripping down   Negative: Sounds like a life-threatening emergency to the triager    Protocols used: Abdominal Pain - Upper-A-OH

## 2024-02-14 NOTE — ED PROVIDER NOTES
Encounter Date: 2/14/2024       History     Chief Complaint   Patient presents with    Flank Pain     Bilateral flank pain, sent from Primary care. Just completed Abx     28-year-old female currently undergoing gender transition, on testosterone presents ER for evaluation of flank pain.  Patient states that over the last 1 month they have noticed some suprapubic pain. On 2/8/24 Was diagnosed with UTI and initally placed on keflex. Symptoms worsened, started on Bactrim on 2/12.  Was seen in the emergency room yesterday for similar complaints diagnosed with pyelonephritis.  Patient currently still on Bactrim.  Denies any dysuria or hematuria today.  Complains of worsening bilateral flank pain.No vaginal discharge.  No vomiting or diarrhea.      Patient reached out uro-Gyne who ordered CT abdomen noncontrast that was obtained prior to ER evaluation today.  No previous history of kidney stone.  Reports that patient has had a fever yesterday and today of 100.4.    Patient states that she is primarily here in the emergency room given that her symptoms had persisted and her pain is not controlled.      The history is provided by the patient.     Review of patient's allergies indicates:  No Known Allergies  Past Medical History:   Diagnosis Date    Migraine      Past Surgical History:   Procedure Laterality Date    TONSILLECTOMY       Family History   Problem Relation Age of Onset    Diabetes Father     Hypertension Father      Social History     Tobacco Use    Smoking status: Never    Smokeless tobacco: Never   Substance Use Topics    Alcohol use: Yes     Comment: social     Drug use: Yes     Types: Marijuana     Review of Systems   Constitutional:  Negative for chills and fever.   HENT:  Negative for congestion.    Eyes:  Negative for visual disturbance.   Respiratory:  Negative for shortness of breath.    Cardiovascular:  Negative for chest pain.   Gastrointestinal:  Negative for nausea and vomiting.   Genitourinary:   Positive for flank pain. Negative for dysuria.   Musculoskeletal:  Negative for myalgias.   Skin:  Negative for rash.   Allergic/Immunologic: Negative for immunocompromised state.   Neurological:  Negative for weakness and numbness.   Hematological:  Does not bruise/bleed easily.   Psychiatric/Behavioral:  Negative for confusion.        Physical Exam     Initial Vitals [02/14/24 1203]   BP Pulse Resp Temp SpO2   110/66 78 16 97.7 °F (36.5 °C) 99 %      MAP       --         Physical Exam    Vitals reviewed.  Constitutional: Camilla Mario appears well-developed and well-nourished. Camilla Mario is not diaphoretic. No distress.   HENT:   Head: Normocephalic and atraumatic.   Eyes: Conjunctivae and EOM are normal.   Neck: Neck supple.   Cardiovascular:  Normal rate, regular rhythm, normal heart sounds and intact distal pulses.           Pulmonary/Chest: Breath sounds normal. No respiratory distress.   Abdominal: Abdomen is soft. There is generalized abdominal tenderness.   No right CVA tenderness.  No left CVA tenderness.   Musculoskeletal:         General: Normal range of motion.      Cervical back: Neck supple.     Neurological: Camilla Mario is alert and oriented to person, place, and time.   Skin: Skin is warm.         ED Course   Procedures  Labs Reviewed   CBC W/ AUTO DIFFERENTIAL - Abnormal; Notable for the following components:       Result Value    MCH 31.4 (*)     Mono # 1.2 (*)     Mono % 15.1 (*)     All other components within normal limits   COMPREHENSIVE METABOLIC PANEL - Abnormal; Notable for the following components:    CO2 30 (*)     Alkaline Phosphatase 52 (*)     Anion Gap 6 (*)     All other components within normal limits   URINALYSIS, REFLEX TO URINE CULTURE - Abnormal; Notable for the following components:    Leukocytes, UA 1+ (*)     All other components within normal limits    Narrative:     Specimen Source->Urine   LACTIC ACID, PLASMA   LIPASE   URINALYSIS  MICROSCOPIC    Narrative:     Specimen Source->Urine   POCT URINE PREGNANCY          Imaging Results    None          Medications   ketorolac injection 15 mg (15 mg Intravenous Given 2/14/24 1436)   sodium chloride 0.9% bolus 1,000 mL 1,000 mL (0 mLs Intravenous Stopped 2/14/24 1539)   morphine injection 4 mg (4 mg Intravenous Given 2/14/24 1611)   ondansetron injection 4 mg (4 mg Intravenous Given 2/14/24 1610)     Medical Decision Making  Differential diagnosis:    Pyelonephritis, nephrolithiasis, appendicitis, UTI, electrolyte derangement, poor pain control    Risk  Prescription drug management.               ED Course as of 02/14/24 1656   Wed Feb 14, 2024   1633 Laboratory studies show white count of 7.7.  Hemoglobin stable.  Chemistries reveal normal liver and kidney function.  Lipase unremarkable.  Lactic acid within normal limits.  Urinalysis shows 1+ leukocyte otherwise unremarkable. [AJ]   1634 CT abdomen pelvis that was obtained prior to ER arrival:    Impression:     Mild right perinephric fat stranding without hydroureteronephrosis, findings suggestive of pyelonephritis or possible recently passed stone.  The distal right ureter is not definitively visualized, cannot entirely exclude distal obstruction.      [AJ]   1635 Less likely passed stone given absence of blood in urine, likely secondary to pyelonephritis.  It appears that patient's UA shows improvement. [AJ]   1635 On reassessment patient is resting comfortably.  Has had improvement of the pain. [AJ]   1635 Patient would like to be discharged home.  Does not want observation stay.  Will prescribed home on Norco for better pain control.      Will advise close follow-up with her Uro-Gyne physician.    Patient agreeable to close follow-up.  And stable for discharge     [AJ]      ED Course User Index  [AJ] Priya Wharton PA-C                           Clinical Impression:  Final diagnoses:  [N12] Pyelonephritis (Primary)          ED Disposition  Condition    Discharge Stable          ED Prescriptions       Medication Sig Dispense Start Date End Date Auth. Provider    HYDROcodone-acetaminophen (NORCO)  mg per tablet Take 1 tablet by mouth every 6 (six) hours as needed for Pain. 12 tablet 2/14/2024 -- Priya Wharton PA-C    ondansetron (ZOFRAN-ODT) 4 MG TbDL Take 1 tablet (4 mg total) by mouth every 6 (six) hours as needed. 12 tablet 2/14/2024 -- Priya Wharton PA-C          Follow-up Information       Follow up With Specialties Details Why Contact Info    Regina Denson MD Internal Medicine   [address] [facility] [address]  [phone]               Priya Wharton PA-C  02/14/24 1766

## 2024-02-14 NOTE — DISCHARGE INSTRUCTIONS
Please follow up with your uro- gynecologist.  You may take Norco for breakthrough pain. Zofran for nausea. Stay hydrated at home.  Return emergency room if you have severe pain or are unable to tolerate your oral antibiotic-or if you have any other concerning symptoms.  Finish the full course of antibiotics your currently on.

## 2024-02-15 ENCOUNTER — PATIENT MESSAGE (OUTPATIENT)
Dept: UROGYNECOLOGY | Facility: CLINIC | Age: 29
End: 2024-02-15
Payer: MEDICAID

## 2024-02-15 DIAGNOSIS — N12 PYELONEPHRITIS: Primary | ICD-10-CM

## 2024-02-15 LAB
C TRACH DNA SPEC QL NAA+PROBE: NOT DETECTED
N GONORRHOEA DNA SPEC QL NAA+PROBE: NOT DETECTED

## 2024-02-15 RX ORDER — SULFAMETHOXAZOLE AND TRIMETHOPRIM 800; 160 MG/1; MG/1
1 TABLET ORAL 2 TIMES DAILY
Qty: 14 TABLET | Refills: 0 | Status: SHIPPED | OUTPATIENT
Start: 2024-02-15 | End: 2024-04-09

## 2024-02-18 LAB
BACTERIA BLD CULT: NORMAL
BACTERIA BLD CULT: NORMAL

## 2024-03-04 ENCOUNTER — PATIENT MESSAGE (OUTPATIENT)
Dept: UROGYNECOLOGY | Facility: CLINIC | Age: 29
End: 2024-03-04
Payer: MEDICAID

## 2024-03-04 DIAGNOSIS — R30.0 DYSURIA: Primary | ICD-10-CM

## 2024-03-14 ENCOUNTER — TELEPHONE (OUTPATIENT)
Dept: ENDOCRINOLOGY | Facility: CLINIC | Age: 29
End: 2024-03-14
Payer: MEDICAID

## 2024-03-14 NOTE — TELEPHONE ENCOUNTER
Called to schedule patient for appointment,no answer left message for patient to return call to clinic or send portal message if available date of 03/21 works.

## 2024-03-14 NOTE — TELEPHONE ENCOUNTER
----- Message from Sami Louis RN sent at 2/21/2024  3:49 PM CST -----  Regarding: FW: Appointment  Contact: pt.  [phone]    ----- Message -----  From: Santhosh Guillen  Sent: 2/21/2024   3:33 PM CST  To: Josie ENGLISH Staff  Subject: Appointment                                      Pt is calling in ref to scheduling an appt with provider. No appts in system.  Patient Requesting Call Back @  [phone]

## 2024-03-21 ENCOUNTER — LAB VISIT (OUTPATIENT)
Dept: LAB | Facility: HOSPITAL | Age: 29
End: 2024-03-21
Attending: INTERNAL MEDICINE
Payer: MEDICAID

## 2024-03-21 ENCOUNTER — PATIENT MESSAGE (OUTPATIENT)
Dept: UROGYNECOLOGY | Facility: CLINIC | Age: 29
End: 2024-03-21
Payer: MEDICAID

## 2024-03-21 ENCOUNTER — TELEPHONE (OUTPATIENT)
Dept: UROGYNECOLOGY | Facility: CLINIC | Age: 29
End: 2024-03-21
Payer: MEDICAID

## 2024-03-21 ENCOUNTER — OFFICE VISIT (OUTPATIENT)
Dept: ENDOCRINOLOGY | Facility: CLINIC | Age: 29
End: 2024-03-21
Payer: MEDICAID

## 2024-03-21 VITALS
BODY MASS INDEX: 26.43 KG/M2 | DIASTOLIC BLOOD PRESSURE: 70 MMHG | WEIGHT: 174.38 LBS | HEIGHT: 68 IN | SYSTOLIC BLOOD PRESSURE: 108 MMHG

## 2024-03-21 DIAGNOSIS — F64.0 TRANSGENDER PERSON ON HORMONE THERAPY: ICD-10-CM

## 2024-03-21 DIAGNOSIS — R30.0 DYSURIA: Primary | ICD-10-CM

## 2024-03-21 DIAGNOSIS — Z79.899 TRANSGENDER PERSON ON HORMONE THERAPY: Primary | ICD-10-CM

## 2024-03-21 DIAGNOSIS — F32.A ANXIETY AND DEPRESSION: ICD-10-CM

## 2024-03-21 DIAGNOSIS — F41.9 ANXIETY AND DEPRESSION: ICD-10-CM

## 2024-03-21 DIAGNOSIS — Z79.899 TRANSGENDER PERSON ON HORMONE THERAPY: ICD-10-CM

## 2024-03-21 DIAGNOSIS — F64.0 TRANSGENDER PERSON ON HORMONE THERAPY: Primary | ICD-10-CM

## 2024-03-21 LAB
ALBUMIN SERPL BCP-MCNC: 4.1 G/DL (ref 3.5–5.2)
ALP SERPL-CCNC: 47 U/L (ref 55–135)
ALT SERPL W/O P-5'-P-CCNC: 10 U/L (ref 10–44)
ANION GAP SERPL CALC-SCNC: 7 MMOL/L (ref 8–16)
AST SERPL-CCNC: 16 U/L (ref 10–40)
BILIRUB SERPL-MCNC: 0.3 MG/DL (ref 0.1–1)
BUN SERPL-MCNC: 18 MG/DL (ref 6–20)
CALCIUM SERPL-MCNC: 9.5 MG/DL (ref 8.7–10.5)
CHLORIDE SERPL-SCNC: 105 MMOL/L (ref 95–110)
CO2 SERPL-SCNC: 28 MMOL/L (ref 23–29)
CREAT SERPL-MCNC: 0.9 MG/DL (ref 0.5–1.4)
ERYTHROCYTE [DISTWIDTH] IN BLOOD BY AUTOMATED COUNT: 11.9 % (ref 11.5–14.5)
EST. GFR  (NO RACE VARIABLE): >60 ML/MIN/1.73 M^2
GLUCOSE SERPL-MCNC: 89 MG/DL (ref 70–110)
HCT VFR BLD AUTO: 43.6 % (ref 37–48.5)
HGB BLD-MCNC: 14.6 G/DL (ref 12–16)
MCH RBC QN AUTO: 30.9 PG (ref 27–31)
MCHC RBC AUTO-ENTMCNC: 33.5 G/DL (ref 32–36)
MCV RBC AUTO: 92 FL (ref 82–98)
PLATELET # BLD AUTO: 217 K/UL (ref 150–450)
PMV BLD AUTO: 9.9 FL (ref 9.2–12.9)
POTASSIUM SERPL-SCNC: 4.7 MMOL/L (ref 3.5–5.1)
PROT SERPL-MCNC: 7 G/DL (ref 6–8.4)
RBC # BLD AUTO: 4.72 M/UL (ref 4–5.4)
SODIUM SERPL-SCNC: 140 MMOL/L (ref 136–145)
WBC # BLD AUTO: 4.2 K/UL (ref 3.9–12.7)

## 2024-03-21 PROCEDURE — 3074F SYST BP LT 130 MM HG: CPT | Mod: CPTII,,, | Performed by: INTERNAL MEDICINE

## 2024-03-21 PROCEDURE — 36415 COLL VENOUS BLD VENIPUNCTURE: CPT | Performed by: INTERNAL MEDICINE

## 2024-03-21 PROCEDURE — 85027 COMPLETE CBC AUTOMATED: CPT | Performed by: INTERNAL MEDICINE

## 2024-03-21 PROCEDURE — 1160F RVW MEDS BY RX/DR IN RCRD: CPT | Mod: CPTII,,, | Performed by: INTERNAL MEDICINE

## 2024-03-21 PROCEDURE — 80053 COMPREHEN METABOLIC PANEL: CPT | Performed by: INTERNAL MEDICINE

## 2024-03-21 PROCEDURE — 99214 OFFICE O/P EST MOD 30 MIN: CPT | Mod: PBBFAC | Performed by: INTERNAL MEDICINE

## 2024-03-21 PROCEDURE — 3078F DIAST BP <80 MM HG: CPT | Mod: CPTII,,, | Performed by: INTERNAL MEDICINE

## 2024-03-21 PROCEDURE — 3008F BODY MASS INDEX DOCD: CPT | Mod: CPTII,,, | Performed by: INTERNAL MEDICINE

## 2024-03-21 PROCEDURE — 99214 OFFICE O/P EST MOD 30 MIN: CPT | Mod: S$PBB,,, | Performed by: INTERNAL MEDICINE

## 2024-03-21 PROCEDURE — 1159F MED LIST DOCD IN RCRD: CPT | Mod: CPTII,,, | Performed by: INTERNAL MEDICINE

## 2024-03-21 PROCEDURE — 99999 PR PBB SHADOW E&M-EST. PATIENT-LVL IV: CPT | Mod: PBBFAC,,, | Performed by: INTERNAL MEDICINE

## 2024-03-21 RX ORDER — TESTOSTERONE CYPIONATE 200 MG/ML
50 INJECTION, SOLUTION INTRAMUSCULAR
Qty: 4 ML | Refills: 5 | Status: SHIPPED | OUTPATIENT
Start: 2024-03-21 | End: 2024-04-20

## 2024-03-21 NOTE — PROGRESS NOTES
"Subjective:      Patient ID: Tasneem Mario is a 28 y.o. adult.    Chief Complaint:  Gender  - in clinic visit 03/21/2024    History of Present Illness  With regards to her gender incongruence care:    Gender identity - nonbinary   Gender fluid   They/ them      Therapists at the time of starting hormonal therapy- yes.  Therapist is aware and supportive      We started low-dose testosterone in August of 2021.  Was  taking 1/2  of a pack a day -    Had difficulty getting the medication.  Needed a prior Auth.  We changed over to injectable testosterone.      Current dose is 50 mg subcu weekly.  They are self injecting sq    They are getting voice changes and bottom growth    They like it    They are comfortable with where they are with regards to changes and would like to keep the dose the same    Gender Surgeries - none, but interested in top surgery    They have worked with the speech team    Fertility concerns - IUD            Social:  Work -  Self employed -    Family -   Supportive   Relationship, orientation - has a casual sexual relationship with men and women  -  Housing - lives with  roomate       Anxiety and depression are stable - on meds      Has migraines and is on meds - no changes with starting testosterone    Recent uti and persistent pain -- ct abn - awaiting cystoscopy   Started on topical ert to address uti    ROS:   As above    Objective:     /70   Ht 5' 8" (1.727 m)   Wt 79.1 kg (174 lb 6.1 oz)   BMI 26.51 kg/m²   BP Readings from Last 3 Encounters:   03/21/24 108/70   02/14/24 118/69   02/13/24 (!) 144/68     Wt Readings from Last 1 Encounters:   03/21/24 0854 79.1 kg (174 lb 6.1 oz)     Body mass index is 26.51 kg/m².      Physical Exam  Vitals reviewed.   Constitutional:       Appearance: Normal appearance.   Neck:      Comments: No goiter   Cardiovascular:      Rate and Rhythm: Normal rate.   Pulmonary:      Effort: Pulmonary effort is normal.   Abdominal:      " Palpations: Abdomen is soft.   Musculoskeletal:      Right lower leg: No edema.      Left lower leg: No edema.   Psychiatric:         Mood and Affect: Mood normal.                Lab reviewed        Assessment and Plan     nonbinary (gender fluid) (they/them)   Transman: gender incongruence   Reviewed therapy, side effects (both wanted and unwanted), possible adverse outcomes, expectations, compliance.          Will continue Testosterone replacement therapy 50 mg q 1 week      Labs  They will let me know if they are in interested  the dose or surgery prior to their yearly   visit  RTC in 12  months with labs   Noting  Nl hh/ for men is:     Hemoglobin 14.0-18.0 g/dL    Hematocrit 40.0-54.0 %               Anxiety and depression  Follow-up with a mental health provider.  Noted that hormonal therapy can impact mood

## 2024-03-25 ENCOUNTER — TELEPHONE (OUTPATIENT)
Dept: UROGYNECOLOGY | Facility: CLINIC | Age: 29
End: 2024-03-25
Payer: MEDICAID

## 2024-03-25 NOTE — TELEPHONE ENCOUNTER
Pt went to the Urgent on Friday was given a script for bactrim. UA was taken but no culture. She  is  requesting a script for pyridium be sent  to [facility] pharmacy. She was advised to call the office on Wednesday  or Thursday if her symptoms persist. Pt verbalized  that she understood.     TIMOTHY Rojo

## 2024-03-25 NOTE — TELEPHONE ENCOUNTER
left for patient.  Alia   ----- Message from Man Patricia sent at 3/22/2024  3:56 PM CDT -----  Name of Who is Calling: ANJANA LOPEZ [[id number]]      What is the request in detail: Pt states she needs an order for a urine culture placed in the system. Please contact to further discuss and advise.        Can the clinic reply by [facility]: Y      What Number to Call Back if not in [facility]:[phone]

## 2024-04-01 ENCOUNTER — LAB VISIT (OUTPATIENT)
Dept: LAB | Facility: OTHER | Age: 29
End: 2024-04-01
Attending: INTERNAL MEDICINE
Payer: MEDICAID

## 2024-04-01 ENCOUNTER — TELEPHONE (OUTPATIENT)
Dept: UROGYNECOLOGY | Facility: CLINIC | Age: 29
End: 2024-04-01
Payer: MEDICAID

## 2024-04-01 ENCOUNTER — PATIENT MESSAGE (OUTPATIENT)
Dept: UROGYNECOLOGY | Facility: CLINIC | Age: 29
End: 2024-04-01
Payer: MEDICAID

## 2024-04-01 DIAGNOSIS — R30.0 DYSURIA: ICD-10-CM

## 2024-04-01 DIAGNOSIS — R30.0 DYSURIA: Primary | ICD-10-CM

## 2024-04-01 LAB
BILIRUB UR QL STRIP: NEGATIVE
CLARITY UR: CLEAR
COLOR UR: YELLOW
GLUCOSE UR QL STRIP: NEGATIVE
HGB UR QL STRIP: NEGATIVE
KETONES UR QL STRIP: NEGATIVE
LEUKOCYTE ESTERASE UR QL STRIP: ABNORMAL
MICROSCOPIC COMMENT: NORMAL
NITRITE UR QL STRIP: NEGATIVE
PH UR STRIP: 7 [PH] (ref 5–8)
PROT UR QL STRIP: ABNORMAL
RBC #/AREA URNS HPF: 1 /HPF (ref 0–4)
SP GR UR STRIP: 1.03 (ref 1–1.03)
SQUAMOUS #/AREA URNS HPF: 1 /HPF
URN SPEC COLLECT METH UR: ABNORMAL
UROBILINOGEN UR STRIP-ACNC: NEGATIVE EU/DL
WBC #/AREA URNS HPF: 1 /HPF (ref 0–5)

## 2024-04-01 PROCEDURE — 81000 URINALYSIS NONAUTO W/SCOPE: CPT | Performed by: NURSE PRACTITIONER

## 2024-04-01 PROCEDURE — 87086 URINE CULTURE/COLONY COUNT: CPT | Performed by: NURSE PRACTITIONER

## 2024-04-02 LAB
BACTERIA UR CULT: NORMAL
BACTERIA UR CULT: NORMAL

## 2024-04-09 ENCOUNTER — OFFICE VISIT (OUTPATIENT)
Dept: UROGYNECOLOGY | Facility: CLINIC | Age: 29
End: 2024-04-09
Payer: MEDICAID

## 2024-04-09 VITALS
SYSTOLIC BLOOD PRESSURE: 109 MMHG | HEIGHT: 68 IN | WEIGHT: 173.06 LBS | BODY MASS INDEX: 26.23 KG/M2 | DIASTOLIC BLOOD PRESSURE: 69 MMHG

## 2024-04-09 DIAGNOSIS — R55 VASOVAGAL EPISODE: Primary | ICD-10-CM

## 2024-04-09 DIAGNOSIS — R30.0 DYSURIA: ICD-10-CM

## 2024-04-09 LAB
BILIRUB SERPL-MCNC: NEGATIVE MG/DL
BLOOD URINE, POC: NEGATIVE
CLARITY, POC UA: CLEAR
COLOR, POC UA: NORMAL
GLUCOSE UR QL STRIP: NEGATIVE
KETONES UR QL STRIP: NEGATIVE
LEUKOCYTE ESTERASE URINE, POC: NEGATIVE
NITRITE, POC UA: NEGATIVE
PH, POC UA: 5
PROTEIN, POC: NEGATIVE
SPECIFIC GRAVITY, POC UA: 1
UROBILINOGEN, POC UA: NEGATIVE

## 2024-04-09 PROCEDURE — 99999PBSHW POCT URINE DIPSTICK WITHOUT MICROSCOPE: Mod: PBBFAC,,,

## 2024-04-09 PROCEDURE — 99212 OFFICE O/P EST SF 10 MIN: CPT | Mod: S$PBB,25,, | Performed by: OBSTETRICS & GYNECOLOGY

## 2024-04-09 PROCEDURE — 87563 M. GENITALIUM AMP PROBE: CPT | Performed by: OBSTETRICS & GYNECOLOGY

## 2024-04-09 PROCEDURE — 81002 URINALYSIS NONAUTO W/O SCOPE: CPT | Mod: PBBFAC | Performed by: OBSTETRICS & GYNECOLOGY

## 2024-04-09 PROCEDURE — 3074F SYST BP LT 130 MM HG: CPT | Mod: CPTII,,, | Performed by: OBSTETRICS & GYNECOLOGY

## 2024-04-09 PROCEDURE — 87798 DETECT AGENT NOS DNA AMP: CPT | Mod: 59 | Performed by: OBSTETRICS & GYNECOLOGY

## 2024-04-09 PROCEDURE — 3008F BODY MASS INDEX DOCD: CPT | Mod: CPTII,,, | Performed by: OBSTETRICS & GYNECOLOGY

## 2024-04-09 PROCEDURE — 52000 CYSTOURETHROSCOPY: CPT | Mod: S$PBB,,, | Performed by: OBSTETRICS & GYNECOLOGY

## 2024-04-09 PROCEDURE — 99213 OFFICE O/P EST LOW 20 MIN: CPT | Mod: PBBFAC | Performed by: OBSTETRICS & GYNECOLOGY

## 2024-04-09 PROCEDURE — 99999 PR PBB SHADOW E&M-EST. PATIENT-LVL III: CPT | Mod: PBBFAC,,, | Performed by: OBSTETRICS & GYNECOLOGY

## 2024-04-09 PROCEDURE — 3078F DIAST BP <80 MM HG: CPT | Mod: CPTII,,, | Performed by: OBSTETRICS & GYNECOLOGY

## 2024-04-09 PROCEDURE — 52000 CYSTOURETHROSCOPY: CPT | Mod: PBBFAC | Performed by: OBSTETRICS & GYNECOLOGY

## 2024-04-09 RX ORDER — SULFAMETHOXAZOLE AND TRIMETHOPRIM 800; 160 MG/1; MG/1
1 TABLET ORAL
Status: COMPLETED | OUTPATIENT
Start: 2024-04-09 | End: 2024-04-09

## 2024-04-09 RX ORDER — LIDOCAINE HYDROCHLORIDE 20 MG/ML
JELLY TOPICAL ONCE
Status: COMPLETED | OUTPATIENT
Start: 2024-04-09 | End: 2024-04-09

## 2024-04-09 RX ADMIN — SULFAMETHOXAZOLE AND TRIMETHOPRIM 1 TABLET: 800; 160 TABLET ORAL at 02:04

## 2024-04-09 RX ADMIN — LIDOCAINE HYDROCHLORIDE 5 ML: 20 JELLY TOPICAL at 02:04

## 2024-04-10 NOTE — PROCEDURES
Cystoscopy    Date/Time: 4/9/2024 1:00 PM    Performed by: Le Herzog MD  Authorized by: Huong Steele NP    Consent Done?:  Yes (Written)  Timeout: prior to procedure the correct patient, procedure, and site was verified    Prep: patient was prepped and draped in usual sterile fashion    Local anesthesia used?: No    Anesthesia:  See MAR for details  Local anesthetic:  Lidocaine 2% topical gel  Anesthetic total (ml):  10  Indications: dysuria    Position:  Dorsal lithotomy  Anesthesia:  See MAR for details  Patient sedated?: No    Preparation: Patient was prepped and draped in usual sterile fashion    Scope type:  Flexible cystoscopeNoNo  Stent inserted: No    Stent removed: No    External exam normal: Yes    Digital exam performed: No     patient tolerated the procedure well with no immediate complications      Patient taken to procedure room and placed in dorsal lithotomy position. Perineum was prepped and draped using sterile technique.The urethra was prepped with betadine, and 10 mL of 2% lidocaine gel were instilled into the bladder through the urethra.   Next, cystourethroscopy was performed utilizing a flexible scope. The flexible cystourethroscope was introduced into the bladder under direct visualization. The bladder was distended with approximately 300 cubic centimeters of sterile water. A systematic survey was performed in which the bladder was surveyed using multiple sequential passes in a clockwise fashion from the bladder dome to the bladder base to the urethrovesical junction. The trigone and ureteral orifices were observed. The scope was then flipped back on itself, and the urethrovesical junction was viewed. Upon completion of the bladder survey, the telescope was then completely withdrawn.    FINDINGS:     Meatus: normal in appearance  Urethra: normal in appearance  Ureteral orifices: normal in appearance with clear efflux from the bilateral ureteral orifices  Bladder Diverticulum:  none  Bladder Stone(s): none  Bladder Tumor: none   Bladder Mucosa: normal in appearance  Bladder Wall: mild trabeculations    Patient tolerated procedure well without complications. Antibiotic prophylaxis was given.      ASSESSMENT/PLAN    Vasovagal episode    Dysuria  -     Cystoscopy  -     POCT URINE DIPSTICK WITHOUT MICROSCOPE  -     Ureaplasma PCR Urine  -     Mycoplasma genitalium Molecular Detection, PCR Urine  -     LIDOcaine HCl 2% urojet  -     sulfamethoxazole-trimethoprim 800-160mg per tablet 1 tablet      27 yo with symptoms of persistent dysuria since around 1/2024 presented today for a cystoscopy. Patient has been using Uribel and vaginal estrogen since her last visit with Huong Steele NP 2/8/24. A UTI was diagnosed at that visit but subsequent cultures were negative. Patient's symptoms are better with the Uribel.     Today, the patient was reassured that the lower urinary tract looks normal. Suggested that we also test for ureaplasma and myocoplasma. Explained significance of the bacteria. Reports being sexually active around the time that symptoms occurred. Not currently active. Has a follow up appointment to review symptoms and next steps with Huong Steele NP on 4/15/24.     Of note, at the end of the procedure the patient had a near syncopal/ vasovagal reaction. Vital signs were stable. Patient reported not having eaten prior to the procedure. Was alert and able to independently ambulate prior to leaving the office with a BP of 112/62 and P 61.       Le Herzog MD

## 2024-04-11 LAB
M GENITALIUM DNA UR QL NAA+PROBE: NEGATIVE
SPECIMEN SOURCE: NORMAL

## 2024-04-12 ENCOUNTER — TELEPHONE (OUTPATIENT)
Dept: UROGYNECOLOGY | Facility: CLINIC | Age: 29
End: 2024-04-12
Payer: MEDICAID

## 2024-04-12 DIAGNOSIS — Z22.39 CARRIER OF UREAPLASMA UREALYTICUM: Primary | ICD-10-CM

## 2024-04-12 LAB
SPECIMEN SOURCE: ABNORMAL
U PARVUM DNA SPEC QL NAA+PROBE: POSITIVE
U UREALYTICUM DNA SPEC QL NAA+PROBE: NEGATIVE

## 2024-04-12 RX ORDER — DOXYCYCLINE 100 MG/1
100 CAPSULE ORAL 2 TIMES DAILY
Qty: 14 CAPSULE | Refills: 0 | Status: SHIPPED | OUTPATIENT
Start: 2024-04-12 | End: 2024-04-19

## 2024-04-15 ENCOUNTER — OFFICE VISIT (OUTPATIENT)
Dept: UROGYNECOLOGY | Facility: CLINIC | Age: 29
End: 2024-04-15
Payer: MEDICAID

## 2024-04-15 DIAGNOSIS — Z22.39 CARRIER OF UREAPLASMA UREALYTICUM: Primary | ICD-10-CM

## 2024-04-15 DIAGNOSIS — M62.89 PELVIC FLOOR TENSION: ICD-10-CM

## 2024-04-15 PROCEDURE — 1159F MED LIST DOCD IN RCRD: CPT | Mod: CPTII,95,, | Performed by: NURSE PRACTITIONER

## 2024-04-15 PROCEDURE — 1160F RVW MEDS BY RX/DR IN RCRD: CPT | Mod: CPTII,95,, | Performed by: NURSE PRACTITIONER

## 2024-04-15 PROCEDURE — 99213 OFFICE O/P EST LOW 20 MIN: CPT | Mod: 95,,, | Performed by: NURSE PRACTITIONER

## 2024-04-15 NOTE — Clinical Note
Please call in vaginal valium 5 mg/ lidocaine 2%/ baclofen 4% Use one twice daily as needed for pain Disp 60  Refill [facility] in [place], [place].  Their phone number is [phone].  Ask for the compounding department.  Thanks,  Huong

## 2024-04-15 NOTE — PROGRESS NOTES
04/15/2024    The patient location is: [place]  The chief complaint leading to consultation is: bladder pain    Visit type: audiovisual      Each patient to whom he or she provides medical services by telemedicine is:  (1) informed of the relationship between the physician and patient and the respective role of any other health care provider with respect to management of the patient; and (2) notified that he or she may decline to receive medical services by telemedicine and may withdraw from such care at any time.    Notes:         02/09/2024  SUBJECTIVE:   28 y.o. female for complaints of intermittent to constant suprapubic pressure 5-8/10 over the past 2 1/2 months.  Ice does improve. Heat does not.  Muscle relaxer helped  Pain did get worse 2 days after intercourse.  Does have dysruia-- uricalm helps that.    Denies constipation.     Goes by: Camilla  Pronouns: they/ them   Relationships: no  Burgin: penetrative occasionally/ no anal     TG care:  --taking T injections 2 - 3 weeks  --followed by Dr. Colon     GOALS:  --deep voice/ bottom growth/ different fat distribution  -- top surgery--would like reduction vs. mastectomy  --hysterectomy and genital reconstruction;--possible hyst-- no genital reconstruction     GYN:  G0  --LMP 11/09/2021  --pelvic symptoms: denies pain  --no vaginal discharge, bleeding, dyspareunia : reports common yeast infection  --STIs: none  --vaginal dryness none  --last GYN exam 1-2 years ago     Psych:  --depression/ anxiety followed by Dr. Marcello Thurman  --taking meds zoloft  --takes remeron for vertigo, migraines,  and nausea  --migraine-- has floater aura, disorientation    04/09/2024  Cysto with Dr. Herzog  Meatus: normal in appearance  Urethra: normal in appearance  Ureteral orifices: normal in appearance with clear efflux from the bilateral ureteral orifices  Bladder Diverticulum: none  Bladder Stone(s): none  Bladder Tumor: none   Bladder Mucosa: normal in appearance  Bladder  Wall: mild trabeculations    Changes since last visit:  + ureaplasma-- currently on doxycycline  Will use vaginal estrogen cream   Intermittent constipation-- using fiber and stool softeners  Will try vaginal valium        Past Medical History:   Diagnosis Date    Migraine        Past Surgical History:   Procedure Laterality Date    TONSILLECTOMY         Family History   Problem Relation Name Age of Onset    Diabetes Father      Hypertension Father         Social History     Socioeconomic History    Marital status: Single   Tobacco Use    Smoking status: Never    Smokeless tobacco: Never   Substance and Sexual Activity    Alcohol use: Yes     Comment: social     Drug use: Yes     Types: Marijuana    Sexual activity: Yes     Partners: Male     Birth control/protection: I.U.D.     Comment: Partner/s sex assigned at birth is male       Current Outpatient Medications   Medication Sig Dispense Refill    ALPRAZolam (XANAX) 0.5 MG tablet Take 0.5 mg by mouth daily as needed.      azelaic acid (AZELEX) 15 % gel Apply topically.      azelastine (ASTELIN) 137 mcg (0.1 %) nasal spray SMARTSIG:Both Nares      BOTOX 200 unit SolR SMARTSI Unit(s) IM Every 3 Months      estradioL (ESTRACE) 0.01 % (0.1 mg/gram) vaginal cream Use 1 gm vaginal and 1 gm around vaginal opening nightly x 2 weeks then twice weekly 42.5 g 3    HYDROcodone-acetaminophen (NORCO)  mg per tablet Take 1 tablet by mouth every 6 (six) hours as needed for Pain. (Patient not taking: Reported on 3/21/2024) 12 tablet 0    levocetirizine (XYZAL) 5 MG tablet Take 5 mg by mouth every evening.      LIDOCAINE 2 %, VALIUM 5 MG, BACLOFEN 4 % SUPPOSITORY Place 1 suppository vaginally 2 (two) times daily as needed (pain). 60 each 5    methen-m.blue-s.phos-phsal-hyo (URIBEL) 118-10-40.8-36 mg Cap Take 1 capsule by mouth 4 (four) times daily as needed (bladder pain). 120 capsule 11    mirtazapine (REMERON) 15 MG tablet Take 15 mg by mouth nightly.      naproxen  "(NAPROSYN) 500 MG tablet Take 1 tablet (500 mg total) by mouth 2 (two) times daily with meals. 30 tablet 1    ondansetron (ZOFRAN-ODT) 4 MG TbDL Take 1 tablet (4 mg total) by mouth every 6 (six) hours as needed. 12 tablet 0    primidone (MYSOLINE) 50 MG Tab Take 50 mg by mouth.      rizatriptan (MAXALT) 10 MG tablet Take 10 mg by mouth.      safety needles (BD SAFETYGLIDE NEEDLE) 25 gauge x 1" Ndle Used to inject testosterone 10 each 5    sertraline (ZOLOFT) 100 MG tablet Take 100 mg by mouth once daily.      spironolactone (ALDACTONE) 50 MG tablet Take 50 mg by mouth.      syringe with needle, safety 3 mL 18 gauge x 1" Syrg Use to draw up testosterone 10 each 5    testosterone cypionate (DEPOTESTOTERONE CYPIONATE) 200 mg/mL injection Inject 0.25 mLs (50 mg total) into the muscle every 7 days. 3 ml syringe with 18 g needle  to draw  X 10 25 g 1 inch needle to inject x 10.  Dispose of a vial after each use 4 mL 5    tretinoin (RETIN-A) 0.01 % gel        Current Facility-Administered Medications   Medication Dose Route Frequency Provider Last Rate Last Admin    copper intrauterine device 380 square mm  mm  380 mm Intrauterine  Huong Steele NP   380 mm at 22 1300       Review of patient's allergies indicates:  No Known Allergies    No LMP recorded. (Menstrual status: Birth Control).    Well Woman:  Pap:2021 normal      OB History          0    Para   0    Term   0       0    AB   0    Living   0         SAB   0    IAB   0    Ectopic   0    Multiple   0    Live Births   0                   ROS:  Feeling well.   No dyspnea or chest pain on exertion.    No abdominal pain, change in bowel habits, black or bloody stools.    Rare BEN + dysuria  GYN ROS: pelvic pain or discharge, no breast pain or new or enlarging lumps on self exam, she complains of PMDD. Has migraines with aura.   No neurological complaints.    OBJECTIVE:   The patient appears well, alert, oriented x 3, in no " distress.  There were no vitals taken for this visit.          PELVIC EXAM: deferred    ASSESSMENT:   1. Carrier of ureaplasma urealyticum  Ureaplasma PCR Urine      2. Pelvic floor tension  LIDOCAINE 2 %, VALIUM 5 MG, BACLOFEN 4 % SUPPOSITORY            PLAN:   1. Gyn care  --up to date    2. Contraception  --IUD in place    3. Ureaplasma  --complete doxycycline    4. Constipation  --continue fiber and stool softener  --follow up with GI      5.concern for frequent uti vs. Dysfunctional voiding/ pelvic floor tension  --continue estrogen cream twice weekly----use dime size amount in vagina-- insert to your second knuckle and rub around just inside vaginal opening twice weekly  --trial of vaginal valium 5 mg/ lidocaine 2%/ baclofen 4 %--can use twice daily as needed--I have sent in the prescription to [facility] in [place], [place].  Their phone number is [phone].  Ask for the compounding department.  --repeat ureaplasma week of 05/06/2024      6.RTC 2-3 months for follow up         Face to Face time with patient: 13  20 minutes of total time spent on the encounter, which includes face to face time and non-face to face time preparing to see the patient (eg, review of tests), Obtaining and/or reviewing separately obtained history, Documenting clinical information in the electronic or other health record, Independently interpreting results (not separately reported) and communicating results to the patient/family/caregiver, or Care coordination (not separately reported).     Huong CORONA [facility]  Division of Female Pelvic Medicine and Reconstructive Surgery  Department of Obstetrics & Gynecology

## 2024-04-23 NOTE — PATIENT INSTRUCTIONS
1. Gyn care  --up to date    2. Contraception  --IUD in place    3. Ureaplasma  --complete doxycycline    4. Constipation  --continue fiber and stool softener  --follow up with GI      5.concern for frequent uti vs. Dysfunctional voiding/ pelvic floor tension  --continue estrogen cream twice weekly----use dime size amount in vagina-- insert to your second knuckle and rub around just inside vaginal opening twice weekly  --trial of vaginal valium 5 mg/ lidocaine 2%/ baclofen 4 %--can use twice daily as needed--I have sent in the prescription to [facility] in [place], [place].  Their phone number is [phone].  Ask for the compounding department.  --repeat ureaplasma week of 05/06/2024      6.RTC 2-3 months for follow up with Dr. Lewis

## 2024-07-08 ENCOUNTER — OFFICE VISIT (OUTPATIENT)
Dept: UROGYNECOLOGY | Facility: CLINIC | Age: 29
End: 2024-07-08
Payer: MEDICAID

## 2024-07-08 VITALS
SYSTOLIC BLOOD PRESSURE: 117 MMHG | HEART RATE: 73 BPM | DIASTOLIC BLOOD PRESSURE: 72 MMHG | HEIGHT: 68 IN | BODY MASS INDEX: 25.7 KG/M2 | WEIGHT: 169.56 LBS

## 2024-07-08 DIAGNOSIS — M79.18 MYALGIA OF PELVIC FLOOR: ICD-10-CM

## 2024-07-08 DIAGNOSIS — Z79.899 TRANSGENDER PERSON ON HORMONE THERAPY: ICD-10-CM

## 2024-07-08 DIAGNOSIS — N95.2 VAGINAL ATROPHY: ICD-10-CM

## 2024-07-08 DIAGNOSIS — K59.09 CHRONIC CONSTIPATION: ICD-10-CM

## 2024-07-08 DIAGNOSIS — A49.3 NONGONOCOCCAL URETHRITIS DUE TO UREAPLASMA UREALYTICUM: ICD-10-CM

## 2024-07-08 DIAGNOSIS — F64.0 TRANSGENDER PERSON ON HORMONE THERAPY: ICD-10-CM

## 2024-07-08 DIAGNOSIS — Z12.4 CERVICAL CANCER SCREENING: Primary | ICD-10-CM

## 2024-07-08 DIAGNOSIS — R39.15 URINARY URGENCY: ICD-10-CM

## 2024-07-08 DIAGNOSIS — N34.1 NONGONOCOCCAL URETHRITIS DUE TO UREAPLASMA UREALYTICUM: ICD-10-CM

## 2024-07-08 PROCEDURE — 3074F SYST BP LT 130 MM HG: CPT | Mod: CPTII,,, | Performed by: OBSTETRICS & GYNECOLOGY

## 2024-07-08 PROCEDURE — 99999 PR PBB SHADOW E&M-EST. PATIENT-LVL V: CPT | Mod: PBBFAC,,, | Performed by: OBSTETRICS & GYNECOLOGY

## 2024-07-08 PROCEDURE — 99213 OFFICE O/P EST LOW 20 MIN: CPT | Mod: S$PBB,,, | Performed by: OBSTETRICS & GYNECOLOGY

## 2024-07-08 PROCEDURE — 99215 OFFICE O/P EST HI 40 MIN: CPT | Mod: PBBFAC | Performed by: OBSTETRICS & GYNECOLOGY

## 2024-07-08 PROCEDURE — 1160F RVW MEDS BY RX/DR IN RCRD: CPT | Mod: CPTII,,, | Performed by: OBSTETRICS & GYNECOLOGY

## 2024-07-08 PROCEDURE — 3078F DIAST BP <80 MM HG: CPT | Mod: CPTII,,, | Performed by: OBSTETRICS & GYNECOLOGY

## 2024-07-08 PROCEDURE — 1159F MED LIST DOCD IN RCRD: CPT | Mod: CPTII,,, | Performed by: OBSTETRICS & GYNECOLOGY

## 2024-07-08 PROCEDURE — 87798 DETECT AGENT NOS DNA AMP: CPT | Performed by: OBSTETRICS & GYNECOLOGY

## 2024-07-08 PROCEDURE — 87624 HPV HI-RISK TYP POOLED RSLT: CPT | Performed by: OBSTETRICS & GYNECOLOGY

## 2024-07-08 PROCEDURE — 88142 CYTOPATH C/V THIN LAYER: CPT | Performed by: PATHOLOGY

## 2024-07-08 PROCEDURE — 3008F BODY MASS INDEX DOCD: CPT | Mod: CPTII,,, | Performed by: OBSTETRICS & GYNECOLOGY

## 2024-07-08 RX ORDER — MIRABEGRON 50 MG/1
50 TABLET, EXTENDED RELEASE ORAL DAILY
Qty: 30 TABLET | Refills: 11 | Status: SHIPPED | OUTPATIENT
Start: 2024-07-08 | End: 2025-07-08

## 2024-07-08 RX ORDER — ESTRADIOL 0.1 MG/G
CREAM VAGINAL
Qty: 42.5 G | Refills: 11 | Status: SHIPPED | OUTPATIENT
Start: 2024-07-08

## 2024-07-08 NOTE — PROGRESS NOTES
Last visit with Cedric HDZ 4/2024:    CC: pelvic, bladder pain    02/09/2024  SUBJECTIVE:   28 y.o. female for complaints of intermittent to constant suprapubic pressure 5-8/10 over the past 2 1/2 months.  Ice does improve. Heat does not.  Muscle relaxer helped  Pain did get worse 2 days after intercourse.  Does have dysruia-- uricalm helps that.    Denies constipation.     Goes by: Camilla  Pronouns: they/ them   Relationships: no  Atalissa: penetrative occasionally/ no anal     TG care:  --taking T injections 2 - 3 weeks  --followed by Dr. Colon     GOALS:  --deep voice/ bottom growth/ different fat distribution  -- top surgery--would like reduction vs. mastectomy  --hysterectomy and genital reconstruction;--possible hyst-- no genital reconstruction     GYN:  G0  --LMP 11/09/2021  --pelvic symptoms: denies pain  --no vaginal discharge, bleeding, dyspareunia : reports common yeast infection  --STIs: none  --vaginal dryness none  --last GYN exam 1-2 years ago     Psych:  --depression/ anxiety followed by Dr. Marcello Thurman  --taking meds zoloft  --takes remeron for vertigo, migraines,  and nausea  --migraine-- has floater aura, disorientation    04/09/2024  Cysto with Dr. Herzog  Meatus: normal in appearance  Urethra: normal in appearance  Ureteral orifices: normal in appearance with clear efflux from the bilateral ureteral orifices  Bladder Diverticulum: none  Bladder Stone(s): none  Bladder Tumor: none   Bladder Mucosa: normal in appearance  Bladder Wall: mild trabeculations    4/2024:  + ureaplasma-- currently on doxycycline  Will use vaginal estrogen cream   Intermittent constipation-- using fiber and stool softeners  Will try vaginal valium    TODAY:  1. Gyn care  --pap due    2. Contraception  --IUD in place  --no issues  --spotting PRN--not bothersome    3. Ureaplasma  --completed doxycycline-not sure if helped symptoms    4. Constipation  --continue fiber and stool softener  --follow up with GI    5. Concern  for frequent uti vs. Dysfunctional voiding/ pelvic floor tension:  --still having some bladder discomfort: pressure when needs to urinate, cramping   --uro MP helps some  --continue estrogen cream twice weekly--use dime size amount in vagina-- insert to your second knuckle and rub around just inside vaginal opening twice weekly   --helps alot  --trial of vaginal valium 5 mg/ lidocaine 2%/ baclofen 4 %--can use twice daily as needed--I have sent in the prescription to [facility] in [place], [place].  Their phone number is [phone].  Ask for the compounding department.   --suppositories help but too expensive  --ureaplasma--treated; not sure if helped symptoms    Past Medical History:   Diagnosis Date    Migraine        Past Surgical History:   Procedure Laterality Date    TONSILLECTOMY         Family History   Problem Relation Name Age of Onset    Diabetes Father      Hypertension Father         Social History     Socioeconomic History    Marital status: Single   Tobacco Use    Smoking status: Never    Smokeless tobacco: Never   Substance and Sexual Activity    Alcohol use: Yes     Comment: social     Drug use: Yes     Types: Marijuana    Sexual activity: Yes     Partners: Male     Birth control/protection: I.U.D.     Comment: Partner/s sex assigned at birth is male       Current Outpatient Medications   Medication Sig Dispense Refill    ALPRAZolam (XANAX) 0.5 MG tablet Take 0.5 mg by mouth daily as needed.      azelaic acid (AZELEX) 15 % gel Apply topically.      azelastine (ASTELIN) 137 mcg (0.1 %) nasal spray SMARTSIG:Both Nares      BOTOX 200 unit SolR SMARTSI Unit(s) IM Every 3 Months      estradioL (ESTRACE) 0.01 % (0.1 mg/gram) vaginal cream Use 1 gm vaginal and 1 gm around vaginal opening nightly x 2 weeks then twice weekly 42.5 g 3    estradioL (ESTRACE) 0.01 % (0.1 mg/gram) vaginal cream 0.5 grams with applicator or dime-sized amount with finger in vagina nightly x 2 weeks, then twice a week thereafter  "42.5 g 11    HYDROcodone-acetaminophen (NORCO)  mg per tablet Take 1 tablet by mouth every 6 (six) hours as needed for Pain. (Patient not taking: Reported on 3/21/2024) 12 tablet 0    levocetirizine (XYZAL) 5 MG tablet Take 5 mg by mouth every evening.      LIDOCAINE 2 %, VALIUM 5 MG, BACLOFEN 4 % SUPPOSITORY Place 1 suppository vaginally 2 (two) times daily as needed (pain). 60 each 5    methen-m.blue-s.phos-phsal-hyo (URIBEL) 118-10-40.8-36 mg Cap Take 1 capsule by mouth 4 (four) times daily as needed (bladder pain). 120 capsule 11    mirabegron (MYRBETRIQ) 50 mg Tb24 Take 1 tablet (50 mg total) by mouth once daily. 30 tablet 11    mirtazapine (REMERON) 15 MG tablet Take 15 mg by mouth nightly.      naproxen (NAPROSYN) 500 MG tablet Take 1 tablet (500 mg total) by mouth 2 (two) times daily with meals. 30 tablet 1    ondansetron (ZOFRAN-ODT) 4 MG TbDL Take 1 tablet (4 mg total) by mouth every 6 (six) hours as needed. 12 tablet 0    primidone (MYSOLINE) 50 MG Tab Take 50 mg by mouth.      rizatriptan (MAXALT) 10 MG tablet Take 10 mg by mouth.      safety needles (BD SAFETYGLIDE NEEDLE) 25 gauge x 1" Ndle Used to inject testosterone 10 each 5    sertraline (ZOLOFT) 100 MG tablet Take 100 mg by mouth once daily.      spironolactone (ALDACTONE) 50 MG tablet Take 50 mg by mouth.      syringe with needle, safety 3 mL 18 gauge x 1" Syrg Use to draw up testosterone 10 each 5    testosterone cypionate (DEPOTESTOTERONE CYPIONATE) 200 mg/mL injection Inject 0.25 mLs (50 mg total) into the muscle every 7 days. 3 ml syringe with 18 g needle  to draw  X 10 25 g 1 inch needle to inject x 10.  Dispose of a vial after each use 4 mL 5    tretinoin (RETIN-A) 0.01 % gel        Current Facility-Administered Medications   Medication Dose Route Frequency Provider Last Rate Last Admin    copper intrauterine device 380 square mm  mm  380 mm Intrauterine  Huong Steele, NP   380 mm at 05/26/22 1300       Review of " "patient's allergies indicates:  No Known Allergies    No LMP recorded. (Menstrual status: Birth Control).    Well Woman:  Pap:2021 normal      OB History          0    Para   0    Term   0       0    AB   0    Living   0         SAB   0    IAB   0    Ectopic   0    Multiple   0    Live Births   0                 Review of Systems as per HPI    Urogynecologic Exam  /72   Pulse 73   Ht 5' 8" (1.727 m)   Wt 76.9 kg (169 lb 8.5 oz)   BMI 25.78 kg/m²     GENERAL APPEARANCE:  The patient is well-developed, well-nourished.   Neck:  Supple with no thyromegaly, no carotid bruits.  Heart:  Regular rate and rhythm, no murmurs, rubs or gallops.  Lungs:  Clear.  No CVA tenderness.  Abdomen:  Soft, nontender, nondistended, no hepatosplenomegaly.  Incisions:  none    PELVIC:    External genitalia:  Normal Bartholins, Skenes and labia bilaterally.    Urethra:  No caruncle, diverticulum or masses.  (+) hypermobility.    Vagina:  Atrophy (--) , no bladder masses or tender, no discharge.  +TTP B LV and OI muscles--feels like discomfort, urinary symptoms.   Cervix:  normal appearance; +IUD strings in place; pap/HPV done  Uterus: normal size, contour, position, consistency, mobility, non-tender  Adnexa: Not palpable.    POP-Q:  Deferred.  No obvious POP present with valsalva.     NEUROLOGIC:  Cranial nerves 2 through 12 intact.  Strength 5/5.  DTRs 2+ lower extremities.  S2 through 4 normal.  Sacral reflexes intact.    EXT: HANEY, 2+ pulses bilaterally, no C/C/E    COUGH STRESS TEST:  negative  KEGEL: 1 /5    RECTAL:    External:  Normal, (--) hemorrhoids, (--) dovetailing.   Internal: deferred    ASSESSMENT:   1. Cervical cancer screening  Liquid-Based Pap Smear, Screening    HPV High Risk Genotypes, PCR      2. Nongonococcal urethritis due to ureaplasma urealyticum  Ureaplasma PCR Urine    Ureaplasma PCR Urine      3. Vaginal atrophy  estradioL (ESTRACE) 0.01 % (0.1 mg/gram) vaginal cream      4. Urinary " urgency  mirabegron (MYRBETRIQ) 50 mg Tb24    Ambulatory referral/consult to Physical/Occupational Therapy      5. Myalgia of pelvic floor  Ambulatory referral/consult to Physical/Occupational Therapy      6. nonbinary (gender fluid) (they/them)         7. Chronic constipation            PLAN:   1. Gyn care  --pap/HPV done today    2. Contraception  --IUD in place    3. Ureaplasma  --completed doxycycline--unsure it helps  --ureaplasma YARI today    4. Constipation  --continue fiber and stool softener  --follow up with GI    5.Concern for frequent uti vs. Dysfunctional voiding/ pelvic floor tension  --PF muscles seem to trigger/reproduce a lot of the bothersome symptoms on exam today  --treat vaginal dryness:  --continue Vaginal estrogen:  --Use 0.5 grams of estrogen cream in vagina with applicator or dime-sized amount with finger (as far as can reach internally) nightly x 2 weeks, then twice a week thereafter.  You can also apply a dime-sized amount with your finger around the vaginal opening and inner lips at same frequency.   --look on amaz[employer] for estrogen vaginal applicators    --Vaginal estrogen may help to decrease pain related to dryness with intercourse and urinary symptoms (urgency/frequency/UTIs) around menopause.     --treat pelvic floor muscle tension:  --start pelvic floor PT.  Call to make appt:  [facility] (all take Medicaid):  1)  BLAZE Munguia/Letitia: (p) [phone]. (f) [phone]. Established patients call:  [phone].  2)  BLAZE Hough/Josefa Bruce: (p) [phone]  . (f) [phone].    3)  BLAZE Lee: (p) [phone].  Or [phone].   4)  BLAZE Jain. (p) [phone].    --trial of vaginal valium 5 mg/ lidocaine 2%/ baclofen 4 %--can use twice daily as needed--I have sent in the prescription to [facility] in [place], [place].  Their phone number is [phone].  Ask for the [employer] department.    --for urinary urgency/frequency:   --start mirabegron 50 mg daily  --use  uro-MP up to 4x/day as needed    --repeat ureaplasma week of 05/06/2024    6. RTC 4 months with Huong Steele NP.     Approx 20 min spent in consult, 90% in discussion.    [facility]  Division of Female Pelvic Medicine and Reconstructive Surgery  Department of Obstetrics & Gynecology

## 2024-07-08 NOTE — PATIENT INSTRUCTIONS
1. Gyn care  --pap/HPV done today    2. Contraception  --IUD in place    3. Ureaplasma  --completed doxycycline--unsure it helps  --ureaplasma YARI today    4. Constipation  --continue fiber and stool softener  --follow up with GI    5.Concern for frequent uti vs. Dysfunctional voiding/ pelvic floor tension  --treat vaginal dryness:  --continue Vaginal estrogen:  --Use 0.5 grams of estrogen cream in vagina with applicator or dime-sized amount with finger (as far as can reach internally) nightly x 2 weeks, then twice a week thereafter.  You can also apply a dime-sized amount with your finger around the vaginal opening and inner lips at same frequency.   --look on [employer] for estrogen vaginal applicators    --Vaginal estrogen may help to decrease pain related to dryness with intercourse and urinary symptoms (urgency/frequency/UTIs) around menopause.     --treat pelvic floor muscle tension:  --start pelvic floor PT.  Call to make appt:  [facility] (all take Medicaid):  1)  [facility]: (p) [phone]. (f) [phone]. Established patients call:  [phone].  2)  [facility]/Josefa Bruce: (p) [phone]  . (f) [phone].    3)  [facility]: (p) [phone].  Or [phone].   4)  [facility]ain. (p) [phone].    --trial of vaginal valium 5 mg/ lidocaine 2%/ baclofen 4 %--can use twice daily as needed--I have sent in the prescription to [facility] in [place], [place].  Their phone number is [phone].  [facility] for the [facility].    --for urinary urgency/frequency:   --start mirabegron 50 mg daily  --use uro-MP up to 4x/day as needed    --repeat ureaplasma week of 05/06/2024    6. RTC 4 months with Huong Steele NP.

## 2024-07-12 LAB
FINAL PATHOLOGIC DIAGNOSIS: NORMAL
Lab: NORMAL
SPECIMEN SOURCE: NORMAL
U PARVUM DNA SPEC QL NAA+PROBE: NEGATIVE
U UREALYTICUM DNA SPEC QL NAA+PROBE: NEGATIVE

## 2024-07-13 LAB
HPV HR 12 DNA SPEC QL NAA+PROBE: NEGATIVE
HPV16 AG SPEC QL: NEGATIVE
HPV18 DNA SPEC QL NAA+PROBE: NEGATIVE

## 2024-08-30 ENCOUNTER — CLINICAL SUPPORT (OUTPATIENT)
Dept: REHABILITATION | Facility: OTHER | Age: 29
End: 2024-08-30
Attending: OBSTETRICS & GYNECOLOGY
Payer: MEDICAID

## 2024-08-30 DIAGNOSIS — M79.18 MYALGIA OF PELVIC FLOOR: ICD-10-CM

## 2024-08-30 DIAGNOSIS — M62.89 PELVIC FLOOR DYSFUNCTION: Primary | ICD-10-CM

## 2024-08-30 DIAGNOSIS — R39.15 URINARY URGENCY: ICD-10-CM

## 2024-08-30 PROCEDURE — 97530 THERAPEUTIC ACTIVITIES: CPT | Mod: PN

## 2024-08-30 PROCEDURE — 97161 PT EVAL LOW COMPLEX 20 MIN: CPT | Mod: PN

## 2024-09-03 NOTE — FIRST PROVIDER EVALUATION
Medical screening examination initiated.  I have conducted a focused provider triage encounter, findings are as follows:    Brief history of present illness:  27 y/o female with bilateral flank pain from kidney infection. On ABX, still symptomatic. Positive for fever chills this am. CT scan completed across the street. Sent from clinic.     Vitals:    02/14/24 1203   BP: 110/66   BP Location: Right arm   Patient Position: Sitting   Pulse: 78   Resp: 16   Temp: 97.7 °F (36.5 °C)   TempSrc: Oral   SpO2: 99%   Weight: 79.4 kg (175 lb)       Pertinent physical exam:  non-toxic, ambulatory, bilateral flank pain    Brief workup plan:  labs, UA - imaging completed - eval for pyelo    Preliminary workup initiated; this workup will be continued and followed by the physician or advanced practice provider that is assigned to the patient when roomed.    Emil Madrid DO, MIKAELEM  Emergency Staff Physician   Dept of Emergency Medicine   [facility]  Spectralink: [id number]        Disclaimer: This note has been generated using voice-recognition software. There may be typographical errors that have been missed during proof-reading.     Up as tolerated

## 2024-09-04 PROBLEM — M62.89 PELVIC FLOOR DYSFUNCTION: Status: ACTIVE | Noted: 2024-09-04
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
## 2024-11-04 ENCOUNTER — PATIENT MESSAGE (OUTPATIENT)
Dept: UROGYNECOLOGY | Facility: CLINIC | Age: 29
End: 2024-11-04
Payer: MEDICAID

## 2024-11-07 ENCOUNTER — PATIENT MESSAGE (OUTPATIENT)
Dept: REHABILITATION | Facility: OTHER | Age: 29
End: 2024-11-07
Payer: MEDICAID

## 2024-11-07 ENCOUNTER — PATIENT MESSAGE (OUTPATIENT)
Dept: ENDOCRINOLOGY | Facility: CLINIC | Age: 29
End: 2024-11-07
Payer: MEDICAID

## 2024-11-07 DIAGNOSIS — F64.0 TRANSGENDER PERSON ON HORMONE THERAPY: Primary | ICD-10-CM

## 2024-11-07 DIAGNOSIS — Z79.899 TRANSGENDER PERSON ON HORMONE THERAPY: Primary | ICD-10-CM

## 2024-11-11 ENCOUNTER — PATIENT MESSAGE (OUTPATIENT)
Dept: REHABILITATION | Facility: OTHER | Age: 29
End: 2024-11-11
Payer: MEDICAID

## 2024-11-22 ENCOUNTER — LAB VISIT (OUTPATIENT)
Dept: LAB | Facility: OTHER | Age: 29
End: 2024-11-22
Attending: INTERNAL MEDICINE
Payer: MEDICAID

## 2024-11-22 DIAGNOSIS — Z79.899 TRANSGENDER PERSON ON HORMONE THERAPY: ICD-10-CM

## 2024-11-22 DIAGNOSIS — F64.0 TRANSGENDER PERSON ON HORMONE THERAPY: ICD-10-CM

## 2024-11-22 LAB
ALBUMIN SERPL BCP-MCNC: 4.3 G/DL (ref 3.5–5.2)
ALP SERPL-CCNC: 50 U/L (ref 40–150)
ALT SERPL W/O P-5'-P-CCNC: 40 U/L (ref 10–44)
ANION GAP SERPL CALC-SCNC: 10 MMOL/L (ref 8–16)
AST SERPL-CCNC: 79 U/L (ref 10–40)
BILIRUB SERPL-MCNC: 0.6 MG/DL (ref 0.1–1)
BUN SERPL-MCNC: 16 MG/DL (ref 6–20)
CALCIUM SERPL-MCNC: 9.3 MG/DL (ref 8.7–10.5)
CHLORIDE SERPL-SCNC: 103 MMOL/L (ref 95–110)
CO2 SERPL-SCNC: 25 MMOL/L (ref 23–29)
CREAT SERPL-MCNC: 0.8 MG/DL (ref 0.5–1.4)
ERYTHROCYTE [DISTWIDTH] IN BLOOD BY AUTOMATED COUNT: 11.7 % (ref 11.5–14.5)
EST. GFR  (NO RACE VARIABLE): >60 ML/MIN/1.73 M^2
GLUCOSE SERPL-MCNC: 80 MG/DL (ref 70–110)
HCT VFR BLD AUTO: 42.5 % (ref 37–48.5)
HGB BLD-MCNC: 14.6 G/DL (ref 12–16)
MCH RBC QN AUTO: 30.2 PG (ref 27–31)
MCHC RBC AUTO-ENTMCNC: 34.4 G/DL (ref 32–36)
MCV RBC AUTO: 88 FL (ref 82–98)
PLATELET # BLD AUTO: 220 K/UL (ref 150–450)
PMV BLD AUTO: 9.7 FL (ref 9.2–12.9)
POTASSIUM SERPL-SCNC: 4.6 MMOL/L (ref 3.5–5.1)
PROT SERPL-MCNC: 7.2 G/DL (ref 6–8.4)
RBC # BLD AUTO: 4.83 M/UL (ref 4–5.4)
SODIUM SERPL-SCNC: 138 MMOL/L (ref 136–145)
TESTOST SERPL-MCNC: 221 NG/DL (ref 5–73)
WBC # BLD AUTO: 5.54 K/UL (ref 3.9–12.7)

## 2024-11-22 PROCEDURE — 85027 COMPLETE CBC AUTOMATED: CPT | Performed by: INTERNAL MEDICINE

## 2024-11-22 PROCEDURE — 36415 COLL VENOUS BLD VENIPUNCTURE: CPT | Performed by: INTERNAL MEDICINE

## 2024-11-22 PROCEDURE — 84403 ASSAY OF TOTAL TESTOSTERONE: CPT | Performed by: INTERNAL MEDICINE

## 2024-11-22 PROCEDURE — 80053 COMPREHEN METABOLIC PANEL: CPT | Performed by: INTERNAL MEDICINE

## 2024-11-26 ENCOUNTER — TELEPHONE (OUTPATIENT)
Dept: UROGYNECOLOGY | Facility: CLINIC | Age: 29
End: 2024-11-26
Payer: MEDICAID

## 2024-11-26 DIAGNOSIS — N32.89 BLADDER SPASM: Primary | ICD-10-CM

## 2024-11-26 RX ORDER — SOLIFENACIN SUCCINATE 5 MG/1
5 TABLET, FILM COATED ORAL DAILY
Qty: 30 TABLET | Refills: 11 | Status: SHIPPED | OUTPATIENT
Start: 2024-11-26 | End: 2025-11-26

## 2024-11-26 NOTE — TELEPHONE ENCOUNTER
----- Message from Hans sent at 11/26/2024 11:44 AM CST -----  Regarding: Self [phone]  Type:  Sooner Appointment Request    Patient is requesting a sooner appointment.  Patient declined first available appointment listed as well as another facility and provider .  Patient will not accept being placed on the waitlist and is requesting a message be sent to doctor.    Name of Caller:  Self     When is the first available appointment? March 2025     Symptoms:  follow up     Would the patient rather a call back or a response via [facility]?  Call back     Best Call Back Number:  [phone]     Additional Information:  tested positive for covid so had to cancel appt today

## 2024-11-26 NOTE — TELEPHONE ENCOUNTER
Complains of bladder pain x 2 weeks--somewhat improved with uribel, but ast has slightly elevated.  They are on sertraline-- contraindicated with methylene blue. Will change to vesicare 5 mg. Will recheck ureaplasma.  MARIBEL Goetz-BC

## 2025-01-02 ENCOUNTER — OFFICE VISIT (OUTPATIENT)
Dept: UROGYNECOLOGY | Facility: CLINIC | Age: 30
End: 2025-01-02
Payer: MEDICAID

## 2025-01-02 VITALS
BODY MASS INDEX: 26.8 KG/M2 | HEIGHT: 68 IN | WEIGHT: 176.81 LBS | SYSTOLIC BLOOD PRESSURE: 131 MMHG | DIASTOLIC BLOOD PRESSURE: 70 MMHG | HEART RATE: 77 BPM

## 2025-01-02 DIAGNOSIS — N32.89 BLADDER SPASM: ICD-10-CM

## 2025-01-02 DIAGNOSIS — N39.8 VOIDING DYSFUNCTION: ICD-10-CM

## 2025-01-02 DIAGNOSIS — R39.15 URINARY URGENCY: Primary | ICD-10-CM

## 2025-01-02 DIAGNOSIS — K59.09 CHRONIC CONSTIPATION: ICD-10-CM

## 2025-01-02 DIAGNOSIS — M62.89 PELVIC FLOOR TENSION: ICD-10-CM

## 2025-01-02 DIAGNOSIS — Z22.39 CARRIER OF UREAPLASMA UREALYTICUM: ICD-10-CM

## 2025-01-02 PROCEDURE — 99215 OFFICE O/P EST HI 40 MIN: CPT | Mod: PBBFAC | Performed by: NURSE PRACTITIONER

## 2025-01-02 PROCEDURE — 99213 OFFICE O/P EST LOW 20 MIN: CPT | Mod: S$PBB,,, | Performed by: NURSE PRACTITIONER

## 2025-01-02 PROCEDURE — 87798 DETECT AGENT NOS DNA AMP: CPT | Performed by: NURSE PRACTITIONER

## 2025-01-02 PROCEDURE — 3075F SYST BP GE 130 - 139MM HG: CPT | Mod: CPTII,,, | Performed by: NURSE PRACTITIONER

## 2025-01-02 PROCEDURE — 1160F RVW MEDS BY RX/DR IN RCRD: CPT | Mod: CPTII,,, | Performed by: NURSE PRACTITIONER

## 2025-01-02 PROCEDURE — 1159F MED LIST DOCD IN RCRD: CPT | Mod: CPTII,,, | Performed by: NURSE PRACTITIONER

## 2025-01-02 PROCEDURE — 3008F BODY MASS INDEX DOCD: CPT | Mod: CPTII,,, | Performed by: NURSE PRACTITIONER

## 2025-01-02 PROCEDURE — 99999 PR PBB SHADOW E&M-EST. PATIENT-LVL V: CPT | Mod: PBBFAC,,, | Performed by: NURSE PRACTITIONER

## 2025-01-02 PROCEDURE — 3078F DIAST BP <80 MM HG: CPT | Mod: CPTII,,, | Performed by: NURSE PRACTITIONER

## 2025-01-02 NOTE — PROGRESS NOTES
01/02/2025    CC: pelvic, bladder pain    02/09/2024  SUBJECTIVE:   29 y.o. female for complaints of intermittent to constant suprapubic pressure 5-8/10 over the past 2 1/2 months.  Ice does improve. Heat does not.  Muscle relaxer helped  Pain did get worse 2 days after intercourse.  Does have dysruia-- uricalm helps that.    Denies constipation.     Goes by: Camilla  Pronouns: they/ them   Relationships: no  Queens: penetrative occasionally/ no anal     TG care:  --taking T injections 2 - 3 weeks  --followed by Dr. Colon     GOALS:  --deep voice/ bottom growth/ different fat distribution  -- top surgery--would like reduction vs. mastectomy  --hysterectomy and genital reconstruction;--possible hyst-- no genital reconstruction     GYN:  G0  --LMP 11/09/2021  --pelvic symptoms: denies pain  --no vaginal discharge, bleeding, dyspareunia : reports common yeast infection  --STIs: none  --vaginal dryness none  --last GYN exam 1-2 years ago     Psych:  --depression/ anxiety followed by Dr. Marcello Thurman  --taking meds zoloft  --takes remeron for vertigo, migraines,  and nausea  --migraine-- has floater aura, disorientation    04/09/2024  Cysto with Dr. Herzog  Meatus: normal in appearance  Urethra: normal in appearance  Ureteral orifices: normal in appearance with clear efflux from the bilateral ureteral orifices  Bladder Diverticulum: none  Bladder Stone(s): none  Bladder Tumor: none   Bladder Mucosa: normal in appearance  Bladder Wall: mild trabeculations    4/2024:  + ureaplasma-- currently on doxycycline  Will use vaginal estrogen cream   Intermittent constipation-- using fiber and stool softeners  Will try vaginal valium    07/08/2024  1. Gyn care  --pap due    2. Contraception  --IUD in place  --no issues  --spotting PRN--not bothersome    3. Ureaplasma  --completed doxycycline-not sure if helped symptoms    4. Constipation  --continue fiber and stool softener  --follow up with GI    5. Concern for frequent uti vs.  Dysfunctional voiding/ pelvic floor tension:  --still having some bladder discomfort: pressure when needs to urinate, cramping   --uro MP helps some  --continue estrogen cream twice weekly--use dime size amount in vagina-- insert to your second knuckle and rub around just inside vaginal opening twice weekly   --helps alot  --trial of vaginal valium 5 mg/ lidocaine 2%/ baclofen 4 %--can use twice daily as needed--I have sent in the prescription to [facility] in [place], [place].  Their phone number is [phone].  Ask for the compounding department.   --suppositories help but too expensive  --ureaplasma--treated; not sure if helped symptoms    Changes since last visit:  Having suprapubic sharp pain --can have some cramping in lower abdomen/ low back  Can get up to 6/10, but generally stays 4/10.  Taking myrebetriq 50 mg and vesicare 5 mg.  Using vaginal valium suppositories intermittently-- can not afford--had minimal improvement  Has not retested for ureaplasma  Still has intermittent constipation-- followed by GI--recommended miralax daily  Using estrogen cream  Denies menses for one year--scant spotting-- has iud in place      Past Medical History:   Diagnosis Date    Migraine        Past Surgical History:   Procedure Laterality Date    TONSILLECTOMY         Family History   Problem Relation Name Age of Onset    Diabetes Father      Hypertension Father         Social History     Socioeconomic History    Marital status: Single   Tobacco Use    Smoking status: Never    Smokeless tobacco: Never   Substance and Sexual Activity    Alcohol use: Yes     Comment: social     Drug use: Yes     Types: Marijuana    Sexual activity: Yes     Partners: Male     Birth control/protection: I.U.D.     Comment: Partner/s sex assigned at birth is male   Social History Narrative    ** Merged History Encounter **            Current Outpatient Medications   Medication Sig Dispense Refill    azelaic acid (AZELEX) 15 % gel Apply topically.       azelastine (ASTELIN) 137 mcg (0.1 %) nasal spray SMARTSIG:Both Nares      BOTOX 200 unit SolR SMARTSI Unit(s) IM Every 3 Months      estradioL (ESTRACE) 0.01 % (0.1 mg/gram) vaginal cream Use 1 gm vaginal and 1 gm around vaginal opening nightly x 2 weeks then twice weekly 42.5 g 3    estradioL (ESTRACE) 0.01 % (0.1 mg/gram) vaginal cream 0.5 grams with applicator or dime-sized amount with finger in vagina nightly x 2 weeks, then twice a week thereafter 42.5 g 11    levocetirizine (XYZAL) 5 MG tablet Take 5 mg by mouth every evening.      LIDOCAINE 2 %, VALIUM 5 MG, BACLOFEN 4 % SUPPOSITORY Place 1 suppository vaginally 2 (two) times daily as needed (pain). 60 each 5    mirabegron (MYRBETRIQ) 50 mg Tb24 Take 1 tablet (50 mg total) by mouth once daily. 30 tablet 11    mirtazapine (REMERON) 15 MG tablet Take 15 mg by mouth nightly.      naproxen (NAPROSYN) 500 MG tablet Take 1 tablet (500 mg total) by mouth 2 (two) times daily with meals. 30 tablet 1    primidone (MYSOLINE) 50 MG Tab Take 50 mg by mouth.      rizatriptan (MAXALT) 10 MG tablet Take 10 mg by mouth.      sertraline (ZOLOFT) 100 MG tablet Take 100 mg by mouth once daily.      solifenacin (VESICARE) 5 MG tablet Take 1 tablet (5 mg total) by mouth once daily. 30 tablet 11    spironolactone (ALDACTONE) 50 MG tablet Take 50 mg by mouth.      testosterone cypionate (DEPOTESTOTERONE CYPIONATE) 200 mg/mL injection Inject 0.25 mLs (50 mg total) into the muscle every 7 days. 3 ml syringe with 18 g needle  to draw  X 10 25 g 1 inch needle to inject x 10.  Dispose of a vial after each use 4 mL 5    tretinoin (RETIN-A) 0.01 % gel       ALPRAZolam (XANAX) 0.5 MG tablet Take 0.5 mg by mouth daily as needed. (Patient not taking: Reported on 2025)      HYDROcodone-acetaminophen (NORCO)  mg per tablet Take 1 tablet by mouth every 6 (six) hours as needed for Pain. (Patient not taking: Reported on 2025) 12 tablet 0    methen-m.blue-s.phos-phsal-hyo  "(URIBEL) 118-10-40.8-36 mg Cap Take 1 capsule by mouth 4 (four) times daily as needed (bladder pain). (Patient not taking: Reported on 1/2/2025) 120 capsule 11    ondansetron (ZOFRAN-ODT) 4 MG TbDL Take 1 tablet (4 mg total) by mouth every 6 (six) hours as needed. (Patient not taking: Reported on 1/2/2025) 12 tablet 0    safety needles (BD SAFETYGLIDE NEEDLE) 25 gauge x 1" Ndle Used to inject testosterone 10 each 5    syringe with needle, safety 3 mL 18 gauge x 1" Syrg Use to draw up testosterone 10 each 5     Current Facility-Administered Medications   Medication Dose Route Frequency Provider Last Rate Last Admin    copper intrauterine device 380 square mm  mm  380 mm Intrauterine  Huong Steele, NP   380 mm at 05/26/22 1300       Review of patient's allergies indicates:  No Known Allergies    No LMP recorded. (Menstrual status: Birth Control).    Well Woman:  Pap:02/2021 normal      OB History    No obstetric history on file.         Review of Systems as per HPI    Well Woman:  Pap:07/2024 normal HPV negative        Urogynecologic Exam  /70   Pulse 77   Ht 5' 8" (1.727 m)   Wt 80.2 kg (176 lb 12.9 oz)   BMI 26.88 kg/m²     GENERAL APPEARANCE:  The patient is well-developed, well-nourished.   Neck:  Supple with no thyromegaly, no carotid bruits.  Heart:  Regular rate and rhythm, no murmurs, rubs or gallops.  Lungs:  Clear.  No CVA tenderness.  Abdomen:  Soft, nontender, nondistended, no hepatosplenomegaly.  Incisions:  none    PELVIC:    External genitalia:  Normal Bartholins, Skenes and labia bilaterally.    Urethra:  No caruncle, diverticulum or masses.  (+) hypermobility.    Vagina:  Atrophy (--) , no bladder masses or tender, no discharge.  +TTP B LV and OI muscles--feels like discomfort, urinary symptoms.   Cervix:  normal appearance; +IUD strings in place; pap/HPV done  Uterus: normal size, contour, position, consistency, mobility, non-tender  Adnexa: Not palpable.    POP-Q:  Deferred. "  No obvious POP present with valsalva.     NEUROLOGIC:  Cranial nerves 2 through 12 intact.  Strength 5/5.  DTRs 2+ lower extremities.  S2 through 4 normal.  Sacral reflexes intact.    EXT: HANEY, 2+ pulses bilaterally, no C/C/E    COUGH STRESS TEST:  negative  KEGEL: 1 /5    RECTAL:    External:  Normal, (--) hemorrhoids, (--) dovetailing.   Internal: deferred    ASSESSMENT:   1. Urinary urgency  Ambulatory Referral/Consult to Physical Therapy      2. Voiding dysfunction  Ambulatory Referral/Consult to Physical Therapy      3. Carrier of ureaplasma urealyticum  Ureaplasma PCR Urine    Ureaplasma PCR Urine      4. Bladder spasm        5. Pelvic floor tension        6. Chronic constipation              PLAN:   1. Gyn care  --up to date    2. Contraception  --IUD in place    3. Ureaplasma  --urine sent today    4. Constipation  --continue fiber and stool softener  --follow up with GI    5.Concern for frequent uti vs. Dysfunctional voiding/ pelvic floor tension  --PF muscles seem to trigger/reproduce a lot of the bothersome symptoms on exam today  --treat vaginal dryness:  --continue Vaginal estrogen:  --Use 0.5 grams of estrogen cream in vagina with applicator or dime-sized amount with finger (as far as can reach internally) nightly x 2 weeks, then twice a week thereafter.  You can also apply a dime-sized amount with your finger around the vaginal opening and inner lips at same frequency.   --look on amazon for estrogen vaginal applicators    --Vaginal estrogen may help to decrease pain related to dryness with intercourse and urinary symptoms (urgency/frequency/UTIs) around menopause.     --treat pelvic floor muscle tension:  --start pelvic floor PT.  Call to make appt:  BLAZE Lee: p) [phone].  Or [phone].   .    --trial of vaginal valium 5 mg/ lidocaine 2%/ baclofen 4 %--can use twice daily as needed--I have sent in the prescription to [facility] in [place], [place].  Their phone number is [phone].  Ask  for the compounding department.    --for urinary urgency/frequency:   --continue mirabegron 50 mg and vesicare 5 mg daily  --use uro-MP up to 4x/day as needed    --repeat ureaplasma today    6. RTC 4 months with Huong Steele NP.     Approx 20 min spent in consult, 90% in discussion.    Huong Steele  [facility]  Division of Female Pelvic Medicine and Reconstructive Surgery  Department of Obstetrics & Gynecology

## 2025-01-02 NOTE — PATIENT INSTRUCTIONS
1. Gyn care  --up to date    2. Contraception  --IUD in place    3. Ureaplasma  --urine sent today    4. Constipation  --continue fiber and stool softener  --follow up with GI    5.Concern for frequent uti vs. Dysfunctional voiding/ pelvic floor tension  --PF muscles seem to trigger/reproduce a lot of the bothersome symptoms on exam today  --treat vaginal dryness:  --continue Vaginal estrogen:  --Use 0.5 grams of estrogen cream in vagina with applicator or dime-sized amount with finger (as far as can reach internally) nightly x 2 weeks, then twice a week thereafter.  You can also apply a dime-sized amount with your finger around the vaginal opening and inner lips at same frequency.   --look on amazon for estrogen vaginal applicators    --Vaginal estrogen may help to decrease pain related to dryness with intercourse and urinary symptoms (urgency/frequency/UTIs) around menopause.     --treat pelvic floor muscle tension:  --start pelvic floor PT.  Call to make appt:  BLAZE Lee: p) [phone].  Or [phone].   .    --trial of vaginal valium 5 mg/ lidocaine 2%/ baclofen 4 %--can use twice daily as needed--I have sent in the prescription to [facility] in [place], [place].  Their phone number is [phone].  Ask for the compounding department.    --for urinary urgency/frequency:   --continue mirabegron 50 mg and vesicare 5 mg daily  --use uro-MP up to 4x/day as needed    --repeat ureaplasma today    6. RTC 4 months with Huong Steele NP.

## 2025-01-07 ENCOUNTER — CLINICAL SUPPORT (OUTPATIENT)
Dept: REHABILITATION | Facility: OTHER | Age: 30
End: 2025-01-07
Payer: MEDICAID

## 2025-01-07 ENCOUNTER — PATIENT MESSAGE (OUTPATIENT)
Dept: UROGYNECOLOGY | Facility: CLINIC | Age: 30
End: 2025-01-07
Payer: MEDICAID

## 2025-01-07 DIAGNOSIS — Z22.39 CARRIER OF UREAPLASMA UREALYTICUM: Primary | ICD-10-CM

## 2025-01-07 DIAGNOSIS — R10.2 PELVIC PAIN: Primary | ICD-10-CM

## 2025-01-07 DIAGNOSIS — N39.8 VOIDING DYSFUNCTION: ICD-10-CM

## 2025-01-07 DIAGNOSIS — R39.15 URINARY URGENCY: ICD-10-CM

## 2025-01-07 PROCEDURE — 97112 NEUROMUSCULAR REEDUCATION: CPT

## 2025-01-07 PROCEDURE — 97162 PT EVAL MOD COMPLEX 30 MIN: CPT

## 2025-01-07 PROCEDURE — 97530 THERAPEUTIC ACTIVITIES: CPT

## 2025-01-07 RX ORDER — DOXYCYCLINE 100 MG/1
100 CAPSULE ORAL 2 TIMES DAILY
Qty: 28 CAPSULE | Refills: 0 | Status: SHIPPED | OUTPATIENT
Start: 2025-01-07 | End: 2025-01-21
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
## 2025-01-09 PROBLEM — R10.2 PELVIC PAIN: Status: ACTIVE | Noted: 2025-01-09
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
## 2025-01-09 NOTE — PATIENT INSTRUCTIONS
"Home Exercise Program: 01/09/2025      INSTRUCTIONS FOR CONTROLLING URINARY URGE      When you experience a strong urge to urinate and know that your bladder is not as full as it has the potential to be (for instance you used the restroom 30 minutes to 1 1/2 hours ago).     FIRST: Stop activity, stand quietly or sit down. Try to stay very still to maintain control. Avoid rushing to the toilet. In this position you can start performing heel raises or toe raises.     SECOND: Begin Quick Flicks (1 second LIFT and squeeze of pelvic floor muscles, 4 second DROP). Pelvic floor contractions send a message to the bladder to relax and hold urine.     THIRD: Relax. Do not rush to the toilet. Take a deep belly or diaphragmatic breath and let it out slowly. Let the urge to urinate pass by using distraction techniques, positive thoughts, visualization, and meditation. Try not to think about going to the bathroom.    FINALLY: If the urge returns, repeat the above steps to regain control. When you feel the urge subside, walk normally to the bathroom. Try to avoid starting to undress until you are in he bathroom and ready to urinate. You can urinate once the urge returns at a later and more appropriate time.     Try this at home first in case you do have an accident, but as you continue to practice, your bladder will increase the ability its capacity and hold more urine without such a strong urge.     "Roll for Control"  Strategies to Delay Voiding    What to do when you feel like you "gotta go gotta go!"    Stop what you are doing.    Take a few good deep breaths (this settles down your nervous system and relaxes your bladder).    WHILE YOU CONTINUE DEEP BREATHING, activate your pelvic floor by performing several quick contractions and releases. (Pelvic floor contractions send a message to the bladder to relax and hold urine.)  Sitting: Roll thigh in and out slowly or squeeze knees together  Standing: Roll thigh in/out slowly and " "gently    As you do the above, you can also count backwards from 100 (to help get your mind off the urge!).     After the urge to void has quietly, you may be able to process with your activity OR if it has been approximately 3 hours since you've voided, you may choose to go to the bathroom and void.      Remember......"Control your bladder before it controls YOU!"          "

## 2025-01-14 ENCOUNTER — CLINICAL SUPPORT (OUTPATIENT)
Dept: REHABILITATION | Facility: OTHER | Age: 30
End: 2025-01-14
Payer: MEDICAID

## 2025-01-14 DIAGNOSIS — R10.2 PELVIC PAIN: ICD-10-CM

## 2025-01-14 DIAGNOSIS — M62.89 PELVIC FLOOR TENSION: Primary | ICD-10-CM

## 2025-01-14 PROCEDURE — 97112 NEUROMUSCULAR REEDUCATION: CPT

## 2025-01-14 PROCEDURE — 97110 THERAPEUTIC EXERCISES: CPT

## 2025-01-14 PROCEDURE — 97140 MANUAL THERAPY 1/> REGIONS: CPT

## 2025-01-14 PROCEDURE — 97530 THERAPEUTIC ACTIVITIES: CPT

## 2025-01-14 NOTE — PROGRESS NOTES
Outpatient Rehab    Physical Therapy Progress Note    Patient Name: Camilla Mario  MRN: [id number]  YOB: 1995  Today's Date: 2/4/2025    Therapy Diagnosis:   Encounter Diagnoses   Name Primary?    Pelvic floor tension Yes    Pelvic pain      Physician: Huong Steele NP    Physician Orders: Eval and Treat  Medical Diagnosis from Referral:   R39.15 (ICD-10-CM) - Urinary urgency   N39.8 (ICD-10-CM) - Voiding dysfunction   Evaluation Date: 1/7/2025  Authorization Period Expiration: 1/2/2026  Plan of Care Expiration: 4/7/2025  Visit # / Visits authorized: 1/ 1     Time In: 1555   Time Out: 1650  Total Time: 55   Total Billable Time: 55         Subjective breathing was slightly helpful during urgency, abdominal pain and initiation of urination.           Objective   Pelvic External Observations  Consent for Examination: Yes    Pelvic Assessment  Perineal Skin Quality: Unremarkable  Perineal Body Resting Position: Normal  Perineal Descent Bearing Down: Present  Volitional Contraction: Present  Volitional Relaxation: Incomplete  Volitional Bearing Down: Incomplete          Pelvic Floor Palpation  Pelvic Floor Exams Performed: External Pelvic and Internal Vaginal  Pelvic Floor Exams Not Performed: Internal Rectal       Right External Pelvic Floor and Rectal Palpation  Abnormal: Layer 1 and Layer 3  Right Pelvic  1 External Palpation Observations: TTP  Right Pelvic  3 External Palpation Observations: TTP       Left External Pelvic Floor and Rectal Palpation  Abnormal: Layer 1 and Layer 3  Left Pelvic  1 External Palpation Observations: TTP  Left Pelvic  3 External Palpation Observations: TTP       Right Internal Pelvic Floor and Rectal Palpation  Abnormal: Layer 1, Layer 2, and Layer 3  Right Pelvic  1 Internal Palpation Observations: increased tension  Right Pelvic  2 Internal Palpation Observations: increased tension  Right Pelvic   3 Internal Palpation Observations: increased tension       Left Internal Pelvic Floor and Rectal Palpation  Abnormal: Layer 1, Layer 2, and Layer 3  Left Pelvic  1 Internal Palpation Observations: increased tension  Left Pelvic  2 Internal Palpation Observations: increased tension  Left Pelvic  3 Internal Palpation Observations: increased tension           Pelvic Floor Special Tests  Single Digit Intravaginal Assessment  Intravaginal Pelvic Floor Strength: 2  Intravaginal Pelvic Floor Relaxation Response: Delayed, Incomplete  Right Vaginal Sensation: Intact  Left Vaginal Sensation: Intact            Treatment:  Therapeutic Exercise  Therapeutic Exercise Activity 1: Supported Butterfly stretch  Therapeutic Exercise Activity 2: SKTC stretch  Therapeutic Exercise Activity 3: Hip IR stretch    Manual Therapy  Manual Therapy Activity 1: MFR pelvic floor - SIP + relaxation    Balance/Neuromuscular Re-Education  Balance/Neuromuscular Re-Education Activity 1: Diaphragmatic breathing + abdominal relaxation  Balance/Neuromuscular Re-Education Activity 2: PF relaxation + coordinated breathing  Balance/Neuromuscular Re-Education Activity 3: Abdominal relaxation + coordinated breathing    Therapeutic Activity  Therapeutic Activity 1: Pelvic Wand use reviewed  Therapeutic Activity 4: Dilator, Pelvic Wand use    Patient's spiritual, cultural, and educational needs considered and patient agreeable to plan of care and goals.     Assessment & Plan   Assessment: Pelvic floor assessed further this session, with Camilla demonstrating significant widespread pelvic floor tension and tenderness. Relaxation training progressed to include increased focus on abdominal relaxation and layer-specific pelvic floor relaxation. Slight improvement in tension noted following completion. Based on performance, Camilla would benefit from pelvic wand use.  Evaluation/Treatment Tolerance: Patient tolerated  treatment well        Plan: review wand use, modify as needed    Goals:   Short Term Goals: 6 weeks -progressing  Patient to demonstrate proper use of urge delay strategies to help reduce urge urinary incontinence with activities of daily living.   Pt to report a decrease in pelvic pressure and fullness to no more than 25% of the time to demonstrate improving pelvic support of pelvic structures.  Pt to demonstrate proper diaphragmatic breathing to help with calming the nervous system in order to decrease pain and to improve abdominal wall and pelvic floor musculature extensibility.   Pt to demonstrate good body mechanics when performing ADLs such as lifting and bending to decrease strain to lumbopelvic structures and reduce risk of further injury.  Pt to report a decrease in pain to no more than 2/10 at it's worst with internal vaginal examination.    Pt to report being able to hold urine for at least 30 minutes without reporting a significant increase in pain to demonstrate a return towards prior level of function.   Pt to demonstrate proper positioning on commode with breathing techniques to decrease strain with BM to enable pt to feel empty after BM.   Pt to report feelings of complete emptying with bowel movements at least 50% of the time to demonstrate improving PF coordination.     Long Term Goals: 12 weeks -progressing  Pt to be discharged with home plan for carry over after discharge.    Pt to report elimination of incontinence with ADLs to demonstrate improved pelvic floor muscle strength and coordination.  Pt to report no longer feeling the need to urinate just in case when shopping or participating in social activities to demonstrate improving pelvic floor and bladder control.  Pt to report a decrease in pelvic pressure and fullness to no more than 10% of the time to demonstrate improving pelvic support of pelvic structures.  Pt to increase pelvic floor strength to at least 4/5 to demonstrate improved  strength needed for continence with ADLs.   Pt will be trained and compliant with postural strategies in sitting and standing to improve alignment and decrease pain and muscle fatigue  Pt to report being able to have a comfortable vaginal exam without significant increase in pelvic pain.  Pt to report a decrease in pain to no more than 1/10 at it's worst with vaginal intercourse.    Pt will be independent with use of dilation or pelvic wand use in order to progress towards self management and intercourse.  Pt to report feelings of complete emptying with bowel movements at least 80% of the time to demonstrate improving PF coordination.  Pt to improve hip strength to 5/5 to allow for improved pelvic stability during ADLs.     CARMEN CHAVEZ, PT

## 2025-02-04 ENCOUNTER — CLINICAL SUPPORT (OUTPATIENT)
Dept: REHABILITATION | Facility: OTHER | Age: 30
End: 2025-02-04
Payer: MEDICAID

## 2025-02-04 DIAGNOSIS — R10.2 PELVIC PAIN: ICD-10-CM

## 2025-02-04 DIAGNOSIS — M62.89 PELVIC FLOOR TENSION: Primary | ICD-10-CM

## 2025-02-04 PROCEDURE — 97140 MANUAL THERAPY 1/> REGIONS: CPT

## 2025-02-04 PROCEDURE — 97112 NEUROMUSCULAR REEDUCATION: CPT

## 2025-02-04 PROCEDURE — 97530 THERAPEUTIC ACTIVITIES: CPT

## 2025-02-04 NOTE — PROGRESS NOTES
Outpatient Rehab    Physical Therapy Visit    Patient Name: Camilla Mario  MRN: [id number]  YOB: 1995  Today's Date: 2/4/2025    Therapy Diagnosis:   Encounter Diagnoses   Name Primary?    Pelvic floor tension Yes    Pelvic pain      Physician: Huong Steele NP    Physician Orders: Eval and Treat  Medical Diagnosis from Referral:   R39.15 (ICD-10-CM) - Urinary urgency   N39.8 (ICD-10-CM) - Voiding dysfunction   Evaluation Date: 1/7/2025  Authorization Period Expiration: 1/2/2026  Plan of Care Expiration: 4/7/2025  Visit # / Visits authorized: 1/ 1     Time In: 1505   Time Out: 1550  Total Time: 45   Total Billable Time: 45         Subjective   urinary urgency and emptying continue to improve slightly; however they are in increased pain today, which they attribute to gap in vesicare use. Has not purchased wand yet, but plans to this week..  Pain reported as 7/10.      Objective            Treatment:  Therapeutic Exercise  Therapeutic Exercise Activity 1: Supported Butterfly stretch  Therapeutic Exercise Activity 2: SKTC stretch  Therapeutic Exercise Activity 3: Hip IR stretch    Manual Therapy  Manual Therapy Activity 1: MFR abdominal wall (in supported butterfly)    Balance/Neuromuscular Re-Education  Balance/Neuromuscular Re-Education Activity 1: Diaphragmatic breathing + abdominal relaxation  Balance/Neuromuscular Re-Education Activity 2: PF relaxation + coordinated breathing    Therapeutic Activity  Therapeutic Activity 1: Pelvic Wand use reviewed  Therapeutic Activity 2: Considerations for return to sexual activity    Patient's spiritual, cultural, and educational needs considered and patient agreeable to plan of care and goals.     Assessment & Plan   Assessment: Increased pain noted at lower abdominal wall on arrival, though significant improvement in pain noted following myofascial mobilization. Based on improvement, PT educated on independent performance at home. Pelvic wand use  also reviewed, with Camilla requiring cueing for improved positioning and wand orientation. Based on performance, plan to review NV before progressing to independent performance.  Evaluation/Treatment Tolerance: Patient tolerated treatment well        Plan: review wand use, modify as needed    Goals:   Short Term Goals: 6 weeks -progressing  Patient to demonstrate proper use of urge delay strategies to help reduce urge urinary incontinence with activities of daily living.   Pt to report a decrease in pelvic pressure and fullness to no more than 25% of the time to demonstrate improving pelvic support of pelvic structures.  Pt to demonstrate proper diaphragmatic breathing to help with calming the nervous system in order to decrease pain and to improve abdominal wall and pelvic floor musculature extensibility.   Pt to demonstrate good body mechanics when performing ADLs such as lifting and bending to decrease strain to lumbopelvic structures and reduce risk of further injury.  Pt to report a decrease in pain to no more than 2/10 at it's worst with internal vaginal examination.    Pt to report being able to hold urine for at least 30 minutes without reporting a significant increase in pain to demonstrate a return towards prior level of function.   Pt to demonstrate proper positioning on commode with breathing techniques to decrease strain with BM to enable pt to feel empty after BM.   Pt to report feelings of complete emptying with bowel movements at least 50% of the time to demonstrate improving PF coordination.     Long Term Goals: 12 weeks -progressing  Pt to be discharged with home plan for carry over after discharge.    Pt to report elimination of incontinence with ADLs to demonstrate improved pelvic floor muscle strength and coordination.  Pt to report no longer feeling the need to urinate just in case when shopping or participating in social activities to demonstrate improving pelvic floor and bladder control.  Pt  to report a decrease in pelvic pressure and fullness to no more than 10% of the time to demonstrate improving pelvic support of pelvic structures.  Pt to increase pelvic floor strength to at least 4/5 to demonstrate improved strength needed for continence with ADLs.   Pt will be trained and compliant with postural strategies in sitting and standing to improve alignment and decrease pain and muscle fatigue  Pt to report being able to have a comfortable vaginal exam without significant increase in pelvic pain.  Pt to report a decrease in pain to no more than 1/10 at it's worst with vaginal intercourse.    Pt will be independent with use of dilation or pelvic wand use in order to progress towards self management and intercourse.  Pt to report feelings of complete emptying with bowel movements at least 80% of the time to demonstrate improving PF coordination.  Pt to improve hip strength to 5/5 to allow for improved pelvic stability during ADLs.     CARMEN CHAVEZ, PT

## 2025-02-12 ENCOUNTER — CLINICAL SUPPORT (OUTPATIENT)
Dept: REHABILITATION | Facility: OTHER | Age: 30
End: 2025-02-12
Payer: MEDICAID

## 2025-02-12 DIAGNOSIS — M62.89 PELVIC FLOOR TENSION: Primary | ICD-10-CM

## 2025-02-12 DIAGNOSIS — R10.2 PELVIC PAIN: ICD-10-CM

## 2025-02-12 PROCEDURE — 97530 THERAPEUTIC ACTIVITIES: CPT

## 2025-02-12 PROCEDURE — 97112 NEUROMUSCULAR REEDUCATION: CPT

## 2025-02-12 PROCEDURE — 97140 MANUAL THERAPY 1/> REGIONS: CPT

## 2025-02-18 ENCOUNTER — CLINICAL SUPPORT (OUTPATIENT)
Dept: REHABILITATION | Facility: OTHER | Age: 30
End: 2025-02-18
Payer: MEDICAID

## 2025-02-18 DIAGNOSIS — R10.2 PELVIC PAIN: ICD-10-CM

## 2025-02-18 DIAGNOSIS — M62.89 PELVIC FLOOR TENSION: Primary | ICD-10-CM

## 2025-02-18 PROCEDURE — 97112 NEUROMUSCULAR REEDUCATION: CPT

## 2025-02-18 PROCEDURE — 97530 THERAPEUTIC ACTIVITIES: CPT

## 2025-02-18 NOTE — PROGRESS NOTES
Outpatient Rehab    Physical Therapy Visit    Patient Name: Camilla Mario  MRN: [id number]  YOB: 1995  Today's Date: 2/18/2025    Therapy Diagnosis:   Encounter Diagnoses   Name Primary?    Pelvic floor tension Yes    Pelvic pain      Physician: Huong Steele NP    Physician Orders: Eval and Treat  Medical Diagnosis from Referral:   R39.15 (ICD-10-CM) - Urinary urgency   N39.8 (ICD-10-CM) - Voiding dysfunction   Evaluation Date: 1/7/2025  Authorization Period Expiration: 1/2/2026  Plan of Care Expiration: 4/7/2025  Visit # / Visits authorized: 4/10     Time In: 1520   Time Out: 1600  Total Time: 40   Total Billable Time: 40         Subjective   brought pelvic wand to practice use. Feels good about releasing layer 1, but had difficulty targeting layer 3.  Pain reported as 3/10.      Objective            Treatment:            Balance/Neuromuscular Re-Education  Balance/Neuromuscular Re-Education Activity 1: Diaphragmatic breathing + abdominal relaxation  Balance/Neuromuscular Re-Education Activity 2: PF relaxation + coordinated breathing - unilateral focus    Therapeutic Activity  Therapeutic Activity 1: Pelvic Wand use reviewed    Patient's spiritual, cultural, and educational needs considered and patient agreeable to plan of care and goals.     Assessment & Plan   Assessment: Improved confidence demonstrated with wand use this session, especially at deeper pelvic floor musculature. They required occasional cueing for decreased forceful expansion of abdominal wall with inhale; significant improvement demonstrated.  Evaluation/Treatment Tolerance: Patient tolerated treatment well        Plan: review contract/relax, consider progressing to elevators    Goals:   Short Term Goals: 6 weeks -progressing  Patient to demonstrate proper use of urge delay strategies to help reduce urge urinary incontinence with activities of daily living.   Pt to report a decrease in pelvic pressure and fullness to  no more than 25% of the time to demonstrate improving pelvic support of pelvic structures.  Pt to demonstrate proper diaphragmatic breathing to help with calming the nervous system in order to decrease pain and to improve abdominal wall and pelvic floor musculature extensibility.   Pt to demonstrate good body mechanics when performing ADLs such as lifting and bending to decrease strain to lumbopelvic structures and reduce risk of further injury.  Pt to report a decrease in pain to no more than 2/10 at it's worst with internal vaginal examination.    Pt to report being able to hold urine for at least 30 minutes without reporting a significant increase in pain to demonstrate a return towards prior level of function.   Pt to demonstrate proper positioning on commode with breathing techniques to decrease strain with BM to enable pt to feel empty after BM.   Pt to report feelings of complete emptying with bowel movements at least 50% of the time to demonstrate improving PF coordination.     Long Term Goals: 12 weeks -progressing  Pt to be discharged with home plan for carry over after discharge.    Pt to report elimination of incontinence with ADLs to demonstrate improved pelvic floor muscle strength and coordination.  Pt to report no longer feeling the need to urinate just in case when shopping or participating in social activities to demonstrate improving pelvic floor and bladder control.  Pt to report a decrease in pelvic pressure and fullness to no more than 10% of the time to demonstrate improving pelvic support of pelvic structures.  Pt to increase pelvic floor strength to at least 4/5 to demonstrate improved strength needed for continence with ADLs.   Pt will be trained and compliant with postural strategies in sitting and standing to improve alignment and decrease pain and muscle fatigue  Pt to report being able to have a comfortable vaginal exam without significant increase in pelvic pain.  Pt to report a  decrease in pain to no more than 1/10 at it's worst with vaginal intercourse.    Pt will be independent with use of dilation or pelvic wand use in order to progress towards self management and intercourse.  Pt to report feelings of complete emptying with bowel movements at least 80% of the time to demonstrate improving PF coordination.  Pt to improve hip strength to 5/5 to allow for improved pelvic stability during ADLs.     CARMEN CHAVEZ, PT

## 2025-02-18 NOTE — PROGRESS NOTES
Outpatient Rehab    Physical Therapy Visit    Patient Name: Camilla Mario  MRN: [id number]  YOB: 1995  Today's Date: 2/18/2025    Therapy Diagnosis:   Encounter Diagnoses   Name Primary?    Pelvic floor tension Yes    Pelvic pain      Physician: Huong Steele NP    Physician Orders: Eval and Treat  Medical Diagnosis from Referral:   R39.15 (ICD-10-CM) - Urinary urgency   N39.8 (ICD-10-CM) - Voiding dysfunction   Evaluation Date: 1/7/2025  Authorization Period Expiration: 1/2/2026  Plan of Care Expiration: 4/7/2025  Visit # / Visits authorized: 3/10     Time In: 1500   Time Out: 1545  Total Time: 45   Total Billable Time: 45         Subjective   constant, achy type abdominal pain has decreased this week, though they have noted increased sporadic deep, sharp, shooting pains.  Pain reported as 4/10.      Objective            Treatment:       Manual Therapy  Manual Therapy Activity 1: MFR abdominal wall    Balance/Neuromuscular Re-Education  Balance/Neuromuscular Re-Education Activity 1: Diaphragmatic breathing + abdominal relaxation  Balance/Neuromuscular Re-Education Activity 2: PF relaxation + coordinated breathing - unilateral focus (+ hip IR, ER, SKTC)    Therapeutic Activity  Therapeutic Activity 1: Pelvic Wand use reviewed  Therapeutic Activity 2: Edu on scoliosis - treatment considerations, effects of pelvic floor    Patient's spiritual, cultural, and educational needs considered and patient agreeable to plan of care and goals.     Assessment & Plan   Assessment: Wand use reviewed this session with increased focus on unilateral focus. Good knowledge of performance demonstrated. She continues with slight abdominal guarding, though much improved. Wand not utilized this session, though use reviewed. Plan to use in session NV.           Plan: review wand use, modify as needed    Goals:   Short Term Goals: 6 weeks -progressing  Patient to demonstrate proper use of urge delay strategies to  help reduce urge urinary incontinence with activities of daily living.   Pt to report a decrease in pelvic pressure and fullness to no more than 25% of the time to demonstrate improving pelvic support of pelvic structures.  Pt to demonstrate proper diaphragmatic breathing to help with calming the nervous system in order to decrease pain and to improve abdominal wall and pelvic floor musculature extensibility.   Pt to demonstrate good body mechanics when performing ADLs such as lifting and bending to decrease strain to lumbopelvic structures and reduce risk of further injury.  Pt to report a decrease in pain to no more than 2/10 at it's worst with internal vaginal examination.    Pt to report being able to hold urine for at least 30 minutes without reporting a significant increase in pain to demonstrate a return towards prior level of function.   Pt to demonstrate proper positioning on commode with breathing techniques to decrease strain with BM to enable pt to feel empty after BM.   Pt to report feelings of complete emptying with bowel movements at least 50% of the time to demonstrate improving PF coordination.     Long Term Goals: 12 weeks -progressing  Pt to be discharged with home plan for carry over after discharge.    Pt to report elimination of incontinence with ADLs to demonstrate improved pelvic floor muscle strength and coordination.  Pt to report no longer feeling the need to urinate just in case when shopping or participating in social activities to demonstrate improving pelvic floor and bladder control.  Pt to report a decrease in pelvic pressure and fullness to no more than 10% of the time to demonstrate improving pelvic support of pelvic structures.  Pt to increase pelvic floor strength to at least 4/5 to demonstrate improved strength needed for continence with ADLs.   Pt will be trained and compliant with postural strategies in sitting and standing to improve alignment and decrease pain and muscle  fatigue  Pt to report being able to have a comfortable vaginal exam without significant increase in pelvic pain.  Pt to report a decrease in pain to no more than 1/10 at it's worst with vaginal intercourse.    Pt will be independent with use of dilation or pelvic wand use in order to progress towards self management and intercourse.  Pt to report feelings of complete emptying with bowel movements at least 80% of the time to demonstrate improving PF coordination.  Pt to improve hip strength to 5/5 to allow for improved pelvic stability during ADLs.     CARMEN CHAVEZ, PT

## 2025-03-06 ENCOUNTER — CLINICAL SUPPORT (OUTPATIENT)
Dept: REHABILITATION | Facility: OTHER | Age: 30
End: 2025-03-06
Payer: MEDICAID

## 2025-03-06 DIAGNOSIS — R30.0 DYSURIA: Primary | ICD-10-CM

## 2025-03-06 DIAGNOSIS — R10.2 PELVIC PAIN: ICD-10-CM

## 2025-03-06 DIAGNOSIS — M62.89 PELVIC FLOOR TENSION: Primary | ICD-10-CM

## 2025-03-06 PROCEDURE — 97112 NEUROMUSCULAR REEDUCATION: CPT

## 2025-03-06 PROCEDURE — 97140 MANUAL THERAPY 1/> REGIONS: CPT

## 2025-03-10 ENCOUNTER — TELEPHONE (OUTPATIENT)
Dept: UROGYNECOLOGY | Facility: CLINIC | Age: 30
End: 2025-03-10
Payer: MEDICAID

## 2025-03-10 NOTE — TELEPHONE ENCOUNTER
----- Message from Estrella sent at 3/10/2025  4:29 PM CDT -----  Regarding: new order needed  Name of Who is Calling: Camilla What is the request in detail: Patient is requesting a call back to have a new urine order put in. Couldn't pass any urine at the time of appointment and was given a cup to bring home and bring back.  Can the clinic reply by [facility]: Yes What Number to Call Back if not in [facility]:  [phone]

## 2025-03-12 ENCOUNTER — LAB VISIT (OUTPATIENT)
Dept: LAB | Facility: OTHER | Age: 30
End: 2025-03-12
Attending: NURSE PRACTITIONER
Payer: MEDICAID

## 2025-03-12 DIAGNOSIS — N32.89 BLADDER SPASM: ICD-10-CM

## 2025-03-12 DIAGNOSIS — Z22.39 CARRIER OF UREAPLASMA UREALYTICUM: ICD-10-CM

## 2025-03-12 PROCEDURE — 87798 DETECT AGENT NOS DNA AMP: CPT | Performed by: NURSE PRACTITIONER

## 2025-03-15 LAB
SPECIMEN SOURCE: NORMAL
SPECIMEN SOURCE: NORMAL
U PARVUM DNA SPEC QL NAA+PROBE: NEGATIVE
U PARVUM DNA SPEC QL NAA+PROBE: NEGATIVE
U UREALYTICUM DNA SPEC QL NAA+PROBE: NEGATIVE
U UREALYTICUM DNA SPEC QL NAA+PROBE: NEGATIVE

## 2025-03-17 NOTE — PROGRESS NOTES
Outpatient Rehab    Physical Therapy Visit    Patient Name: Camilla Mario  MRN: [id number]  YOB: 1995  Encounter Date: 3/6/2025    Therapy Diagnosis:   Encounter Diagnoses   Name Primary?    Pelvic floor tension Yes    Pelvic pain      Physician: Huong Steele NP    Physician Orders: Eval and Treat  Medical Diagnosis: Urinary urgency  Voiding dysfunction    Visit # / Visits Authorized:  5 / 10  Date of Evaluation: 1/7/2025  Insurance Authorization Period: 1/7/2025 to 12/31/2025  Plan of Care Certification:  1/7/2025 to 4/7/2025      PT/PTA:     Number of PTA visits since last PT visit:   Time In: 1410   Time Out: 1440  Total Time: 30   Total Billable Time: 30    FOTO:  Intake Score:  %  Survey Score 1:  %  Survey Score 2:  %         Subjective   Pain was intense yesterday, following walking in heels on Tuesday. Has decreased slightly but continues. Uses vaginal suppository to treat..  Pain reported as 7/10.      Objective            Treatment:  Manual Therapy  MT 1: MFR abdominal wall  MT 2: MFR pelvic floor - bimanual technique  Balance/Neuromuscular Re-Education  NMR 1: Diaphragmatic breathing + abdominal relaxation  NMR 2: PF relaxation + coordinated breathing - unilateral focus    Time Entry(in minutes):  Manual Therapy Time Entry: 15  Neuromuscular Re-Education Time Entry: 15    Assessment & Plan   Assessment: Significant improvement in pain reported following abdominal and pelvic floor relaxation training. Postural considerations reviewed, as walking in heels reportedly worsened pain. Based on presentation, Camilla would benefit from continued abdominopelvic mobilization training.  Evaluation/Treatment Tolerance: Patient tolerated treatment well    Patient will continue to benefit from skilled outpatient physical therapy to address the deficits listed in the problem list box on initial evaluation, provide pt/family education and to maximize pt's level of independence in the home and  community environment.     Patient's spiritual, cultural, and educational needs considered and patient agreeable to plan of care and goals.           Plan: review utility of independent abdominal wall MFR, review contract/relax, consider progressing to elevators    Goals:   Short Term Goals: 6 weeks -progressing  Patient to demonstrate proper use of urge delay strategies to help reduce urge urinary incontinence with activities of daily living.   Pt to report a decrease in pelvic pressure and fullness to no more than 25% of the time to demonstrate improving pelvic support of pelvic structures.  Pt to demonstrate proper diaphragmatic breathing to help with calming the nervous system in order to decrease pain and to improve abdominal wall and pelvic floor musculature extensibility.   Pt to demonstrate good body mechanics when performing ADLs such as lifting and bending to decrease strain to lumbopelvic structures and reduce risk of further injury.  Pt to report a decrease in pain to no more than 2/10 at it's worst with internal vaginal examination.    Pt to report being able to hold urine for at least 30 minutes without reporting a significant increase in pain to demonstrate a return towards prior level of function.   Pt to demonstrate proper positioning on commode with breathing techniques to decrease strain with BM to enable pt to feel empty after BM.   Pt to report feelings of complete emptying with bowel movements at least 50% of the time to demonstrate improving PF coordination.     Long Term Goals: 12 weeks -progressing  Pt to be discharged with home plan for carry over after discharge.    Pt to report elimination of incontinence with ADLs to demonstrate improved pelvic floor muscle strength and coordination.  Pt to report no longer feeling the need to urinate just in case when shopping or participating in social activities to demonstrate improving pelvic floor and bladder control.  Pt to report a decrease in  pelvic pressure and fullness to no more than 10% of the time to demonstrate improving pelvic support of pelvic structures.  Pt to increase pelvic floor strength to at least 4/5 to demonstrate improved strength needed for continence with ADLs.   Pt will be trained and compliant with postural strategies in sitting and standing to improve alignment and decrease pain and muscle fatigue  Pt to report being able to have a comfortable vaginal exam without significant increase in pelvic pain.  Pt to report a decrease in pain to no more than 1/10 at it's worst with vaginal intercourse.    Pt will be independent with use of dilation or pelvic wand use in order to progress towards self management and intercourse.  Pt to report feelings of complete emptying with bowel movements at least 80% of the time to demonstrate improving PF coordination.  Pt to improve hip strength to 5/5 to allow for improved pelvic stability during ADLs.     CARMEN CHAVEZ, PT

## 2025-03-19 ENCOUNTER — RESULTS FOLLOW-UP (OUTPATIENT)
Dept: UROGYNECOLOGY | Facility: CLINIC | Age: 30
End: 2025-03-19

## 2025-03-19 ENCOUNTER — CLINICAL SUPPORT (OUTPATIENT)
Dept: REHABILITATION | Facility: OTHER | Age: 30
End: 2025-03-19
Payer: MEDICAID

## 2025-03-19 DIAGNOSIS — R10.2 PELVIC PAIN: ICD-10-CM

## 2025-03-19 DIAGNOSIS — M62.89 PELVIC FLOOR TENSION: Primary | ICD-10-CM

## 2025-03-19 PROCEDURE — 97110 THERAPEUTIC EXERCISES: CPT

## 2025-03-19 NOTE — PROGRESS NOTES
Outpatient Rehab    Physical Therapy Visit    Patient Name: Camilla Mario  MRN: [id number]  YOB: 1995  Encounter Date: 3/19/2025    Therapy Diagnosis:   Encounter Diagnoses   Name Primary?    Pelvic floor tension Yes    Pelvic pain      Physician: Huong Steele NP    Physician Orders: Eval and Treat  Medical Diagnosis: Urinary urgency  Voiding dysfunction    Visit # / Visits Authorized:  6 / 10  Insurance Authorization Period: 1/7/2025 to 12/31/2025  Date of Evaluation: 1/7/2025  Plan of Care Certification: 1/7/2025 to 4/7/2025     PT/PTA:     Number of PTA visits since last PT visit:   Time In: 1525   Time Out: 1600  Total Time: 35   Total Billable Time:  35    FOTO:  Intake Score:  %  Survey Score 1:  %  Survey Score 2:  %         Subjective   decreased pain this week; unsure if this may be secondary to increased water intake..         Objective            Treatment:  Therapeutic Exercise  TE 1: abdominal PF relaxation + coordinated breathing  TE 2: PF relaxation + coordinated breathing - unilateral focus  TE 3: PFM elevators - emphasis on complete relaxation      Time Entry(in minutes):  Therapeutic Exercise Time Entry: 35    Assessment & Plan   Assessment: Elevators introduced this session with emphasis on complete relaxation. Good performance demonstrated, though concentration required for complete relaxation. Based on performance, plan to review performance NV prior to progressing.  Evaluation/Treatment Tolerance: Patient tolerated treatment well    Patient will continue to benefit from skilled outpatient physical therapy to address the deficits listed in the problem list box on initial evaluation, provide pt/family education and to maximize pt's level of independence in the home and community environment.     Patient's spiritual, cultural, and educational needs considered and patient agreeable to plan of care and goals.           Plan: review elevators, progress  positioning    Goals:   Short Term Goals: 6 weeks -progressing  Patient to demonstrate proper use of urge delay strategies to help reduce urge urinary incontinence with activities of daily living.   Pt to report a decrease in pelvic pressure and fullness to no more than 25% of the time to demonstrate improving pelvic support of pelvic structures.  Pt to demonstrate proper diaphragmatic breathing to help with calming the nervous system in order to decrease pain and to improve abdominal wall and pelvic floor musculature extensibility.   Pt to demonstrate good body mechanics when performing ADLs such as lifting and bending to decrease strain to lumbopelvic structures and reduce risk of further injury.  Pt to report a decrease in pain to no more than 2/10 at it's worst with internal vaginal examination.    Pt to report being able to hold urine for at least 30 minutes without reporting a significant increase in pain to demonstrate a return towards prior level of function.   Pt to demonstrate proper positioning on commode with breathing techniques to decrease strain with BM to enable pt to feel empty after BM.   Pt to report feelings of complete emptying with bowel movements at least 50% of the time to demonstrate improving PF coordination.     Long Term Goals: 12 weeks -progressing  Pt to be discharged with home plan for carry over after discharge.    Pt to report elimination of incontinence with ADLs to demonstrate improved pelvic floor muscle strength and coordination.  Pt to report no longer feeling the need to urinate just in case when shopping or participating in social activities to demonstrate improving pelvic floor and bladder control.  Pt to report a decrease in pelvic pressure and fullness to no more than 10% of the time to demonstrate improving pelvic support of pelvic structures.  Pt to increase pelvic floor strength to at least 4/5 to demonstrate improved strength needed for continence with ADLs.   Pt will  be trained and compliant with postural strategies in sitting and standing to improve alignment and decrease pain and muscle fatigue  Pt to report being able to have a comfortable vaginal exam without significant increase in pelvic pain.  Pt to report a decrease in pain to no more than 1/10 at it's worst with vaginal intercourse.    Pt will be independent with use of dilation or pelvic wand use in order to progress towards self management and intercourse.  Pt to report feelings of complete emptying with bowel movements at least 80% of the time to demonstrate improving PF coordination.  Pt to improve hip strength to 5/5 to allow for improved pelvic stability during ADLs.     CARMEN CHAVEZ, PT

## 2025-03-31 ENCOUNTER — CLINICAL SUPPORT (OUTPATIENT)
Dept: REHABILITATION | Facility: OTHER | Age: 30
End: 2025-03-31
Payer: MEDICAID

## 2025-03-31 DIAGNOSIS — M62.89 PELVIC FLOOR TENSION: Primary | ICD-10-CM

## 2025-03-31 DIAGNOSIS — R10.2 PELVIC PAIN: ICD-10-CM

## 2025-03-31 PROCEDURE — 97110 THERAPEUTIC EXERCISES: CPT

## 2025-03-31 NOTE — PROGRESS NOTES
Outpatient Rehab    Physical Therapy Visit    Patient Name: Camilla Mario  MRN: [id number]  YOB: 1995  Encounter Date: 3/31/2025    Therapy Diagnosis:   Encounter Diagnoses   Name Primary?    Pelvic floor tension Yes    Pelvic pain      Physician: Huong Steele NP    Physician Orders: Eval and Treat  Medical Diagnosis: Urinary urgency  Voiding dysfunction    Visit # / Visits Authorized:  7 / 10  Insurance Authorization Period: 1/7/2025 to 12/31/2025  Date of Evaluation: 1/7/2025  Plan of Care Certification: 1/7/2025 to 4/7/2025     PT/PTA:     Number of PTA visits since last PT visit:   Time In: 1525   Time Out: 1605  Total Time: 40   Total Billable Time:  40    FOTO:  Intake Score:  %  Survey Score 1:  %  Survey Score 2:  %         Subjective   pain has remained low since previous session. elevators have been going well.         Objective            Treatment:  Therapeutic Exercise  TE 1: PFM elevators - emphasis on complete relaxation (in supine, sitting, standing, lateral weight shifting)      Time Entry(in minutes):  Therapeutic Exercise Time Entry: 40    Assessment & Plan   Assessment: Biofeedback utilized this session to reassess quality of elevator performance. Good performance demonstrated in all positions, though limited relaxation noted when weight shifted to L side in standing. This supports previous findings of increased tension and sensitivity on L side, likely secondary to scoliotic curve and muscular imbalance  Evaluation/Treatment Tolerance: Patient tolerated treatment well    Patient will continue to benefit from skilled outpatient physical therapy to address the deficits listed in the problem list box on initial evaluation, provide pt/family education and to maximize pt's level of independence in the home and community environment.     Patient's spiritual, cultural, and educational needs considered and patient agreeable to plan of care and goals.           Plan: consider  pelvic floor elevators + core exercises    Goals:   Short Term Goals: 6 weeks -progressing  Patient to demonstrate proper use of urge delay strategies to help reduce urge urinary incontinence with activities of daily living.   Pt to report a decrease in pelvic pressure and fullness to no more than 25% of the time to demonstrate improving pelvic support of pelvic structures.  Pt to demonstrate proper diaphragmatic breathing to help with calming the nervous system in order to decrease pain and to improve abdominal wall and pelvic floor musculature extensibility.   Pt to demonstrate good body mechanics when performing ADLs such as lifting and bending to decrease strain to lumbopelvic structures and reduce risk of further injury.  Pt to report a decrease in pain to no more than 2/10 at it's worst with internal vaginal examination.    Pt to report being able to hold urine for at least 30 minutes without reporting a significant increase in pain to demonstrate a return towards prior level of function.   Pt to demonstrate proper positioning on commode with breathing techniques to decrease strain with BM to enable pt to feel empty after BM.   Pt to report feelings of complete emptying with bowel movements at least 50% of the time to demonstrate improving PF coordination.     Long Term Goals: 12 weeks -progressing  Pt to be discharged with home plan for carry over after discharge.    Pt to report elimination of incontinence with ADLs to demonstrate improved pelvic floor muscle strength and coordination.  Pt to report no longer feeling the need to urinate just in case when shopping or participating in social activities to demonstrate improving pelvic floor and bladder control.  Pt to report a decrease in pelvic pressure and fullness to no more than 10% of the time to demonstrate improving pelvic support of pelvic structures.  Pt to increase pelvic floor strength to at least 4/5 to demonstrate improved strength needed for  continence with ADLs.   Pt will be trained and compliant with postural strategies in sitting and standing to improve alignment and decrease pain and muscle fatigue  Pt to report being able to have a comfortable vaginal exam without significant increase in pelvic pain.  Pt to report a decrease in pain to no more than 1/10 at it's worst with vaginal intercourse.    Pt will be independent with use of dilation or pelvic wand use in order to progress towards self management and intercourse.  Pt to report feelings of complete emptying with bowel movements at least 80% of the time to demonstrate improving PF coordination.  Pt to improve hip strength to 5/5 to allow for improved pelvic stability during ADLs.     CARMEN CHAVEZ, PT

## 2025-04-22 ENCOUNTER — TELEPHONE (OUTPATIENT)
Dept: UROGYNECOLOGY | Facility: CLINIC | Age: 30
End: 2025-04-22
Payer: MEDICAID

## 2025-05-01 ENCOUNTER — CLINICAL SUPPORT (OUTPATIENT)
Dept: REHABILITATION | Facility: OTHER | Age: 30
End: 2025-05-01
Payer: MEDICAID

## 2025-05-01 DIAGNOSIS — R10.2 PELVIC PAIN: ICD-10-CM

## 2025-05-01 DIAGNOSIS — M62.89 PELVIC FLOOR TENSION: Primary | ICD-10-CM

## 2025-05-01 PROCEDURE — 97110 THERAPEUTIC EXERCISES: CPT

## 2025-05-02 ENCOUNTER — LAB VISIT (OUTPATIENT)
Dept: LAB | Facility: OTHER | Age: 30
End: 2025-05-02
Attending: OBSTETRICS & GYNECOLOGY
Payer: MEDICAID

## 2025-05-02 ENCOUNTER — OFFICE VISIT (OUTPATIENT)
Dept: UROGYNECOLOGY | Facility: CLINIC | Age: 30
End: 2025-05-02
Payer: MEDICAID

## 2025-05-02 VITALS
HEIGHT: 68 IN | SYSTOLIC BLOOD PRESSURE: 114 MMHG | BODY MASS INDEX: 26.6 KG/M2 | DIASTOLIC BLOOD PRESSURE: 68 MMHG | WEIGHT: 175.5 LBS | HEART RATE: 73 BPM

## 2025-05-02 DIAGNOSIS — N32.89 BLADDER SPASM: ICD-10-CM

## 2025-05-02 DIAGNOSIS — N30.00 ACUTE CYSTITIS WITHOUT HEMATURIA: ICD-10-CM

## 2025-05-02 DIAGNOSIS — M62.89 PELVIC FLOOR TENSION: ICD-10-CM

## 2025-05-02 DIAGNOSIS — N95.2 VAGINAL ATROPHY: ICD-10-CM

## 2025-05-02 DIAGNOSIS — K59.09 CHRONIC CONSTIPATION: ICD-10-CM

## 2025-05-02 DIAGNOSIS — N89.8 VAGINAL ODOR: Primary | ICD-10-CM

## 2025-05-02 DIAGNOSIS — R39.15 URINARY URGENCY: ICD-10-CM

## 2025-05-02 DIAGNOSIS — R30.0 DYSURIA: ICD-10-CM

## 2025-05-02 PROCEDURE — 99999 PR PBB SHADOW E&M-EST. PATIENT-LVL IV: CPT | Mod: PBBFAC,,, | Performed by: OBSTETRICS & GYNECOLOGY

## 2025-05-02 PROCEDURE — 36415 COLL VENOUS BLD VENIPUNCTURE: CPT

## 2025-05-02 PROCEDURE — 82955 ASSAY OF G6PD ENZYME: CPT

## 2025-05-02 PROCEDURE — 99214 OFFICE O/P EST MOD 30 MIN: CPT | Mod: PBBFAC | Performed by: OBSTETRICS & GYNECOLOGY

## 2025-05-02 RX ORDER — MIRABEGRON 50 MG/1
50 TABLET, FILM COATED, EXTENDED RELEASE ORAL DAILY
Qty: 30 TABLET | Refills: 11 | Status: SHIPPED | OUTPATIENT
Start: 2025-05-02 | End: 2026-05-02

## 2025-05-02 RX ORDER — ESTRADIOL 0.1 MG/G
CREAM VAGINAL
Qty: 42.5 G | Refills: 11 | Status: SHIPPED | OUTPATIENT
Start: 2025-05-02

## 2025-05-02 RX ORDER — SOLIFENACIN SUCCINATE 5 MG/1
5 TABLET, FILM COATED ORAL DAILY
Qty: 30 TABLET | Refills: 11 | Status: SHIPPED | OUTPATIENT
Start: 2025-05-02 | End: 2026-05-02

## 2025-05-02 NOTE — PATIENT INSTRUCTIONS
1. Gyn care  --up to date    2. Contraception  --IUD in place    3. Ureaplasma  --neg on 1/2025 and 3/2025 repeats    4. Constipation  --continue fiber daily  --continue miralax daily  --hydrate well  --follow up with GI    5.Concern for frequent uti vs. Dysfunctional voiding/ pelvic floor tension  --PF muscles seem to trigger/reproduce a lot of the bothersome symptoms on exam today  --treat vaginal dryness:  --continue Vaginal estrogen:  --AT LEAST 2X/WEEK Use 0.5 grams of estrogen cream in vagina with applicator or dime-sized amount with finger (as far as can reach internally) at night.  You can also apply a dime-sized amount with your finger around the vaginal opening and inner lips at same frequency.   --look on amazon for estrogen vaginal applicators    --Vaginal estrogen may help to decrease pain related to dryness with intercourse and urinary symptoms (urgency/frequency/UTIs) around menopause.     --treat pelvic floor muscle tension:  --continue pelvic floor PT.  Call to make appt:  BLAZE Lee: p) [phone].  Or [phone].   --use therawand at home at least 3-4x/week  .    --trial of vaginal valium 5 mg/ lidocaine 2%/ baclofen 4 %--can use twice daily as needed--I have sent in the prescription to [facility] in [place], [place].  Their phone number is [phone].  Ask for the compounding department.    --for urinary urgency/frequency:   --continue mirabegron 50 mg and vesicare 5 mg daily  --use uro-MP up to 4x/day as needed   --test for G6PD deficiency    TALK WITH THERAPIST: about ways to recognize anxiety and not internalize this   --discuss if need any rescues meds with psychiatrist--get name of new MD from them since they are retiring    FOR FLARES:  --take uribel (up to 4x/day), Aleve, heat, suppository, stretches/massage    6.  Vaginal odor:  --affirm    7.  RTC 4 months.     Updated office note has been faxed to [facility] to continue with bracing process.

## 2025-05-02 NOTE — PROGRESS NOTES
01/02/2025    CC: Follow up.  pelvic, bladder pain.  Last visit with Cedric HDZ 1/2025.      HPI:  02/09/2024  SUBJECTIVE:   29 y.o. female for complaints of intermittent to constant suprapubic pressure 5-8/10 over the past 2 1/2 months.  Ice does improve. Heat does not.  Muscle relaxer helped  Pain did get worse 2 days after intercourse.  Does have dysruia-- uricalm helps that.    Denies constipation.     Goes by: Camilla  Pronouns: they/ them   Relationships: no  [place]: penetrative occasionally/ no anal     TG care:  --taking T injections 2 - 3 weeks  --followed by Dr. Colon     GOALS:  --deep voice/ bottom growth/ different fat distribution  -- top surgery--would like reduction vs. mastectomy  --hysterectomy and genital reconstruction;--possible hyst-- no genital reconstruction     GYN:  G0  --LMP 11/09/2021  --pelvic symptoms: denies pain  --no vaginal discharge, bleeding, dyspareunia : reports common yeast infection  --STIs: none  --vaginal dryness none  --last GYN exam 1-2 years ago     Psych:  --depression/ anxiety followed by Dr. Marcello Thurman  --taking meds zoloft  --takes remeron for vertigo, migraines,  and nausea  --migraine-- has floater aura, disorientation    04/09/2024  Cysto with Dr. Herzog  Meatus: normal in appearance  Urethra: normal in appearance  Ureteral orifices: normal in appearance with clear efflux from the bilateral ureteral orifices  Bladder Diverticulum: none  Bladder Stone(s): none  Bladder Tumor: none   Bladder Mucosa: normal in appearance  Bladder Wall: mild trabeculations    4/2024:  + ureaplasma-- currently on doxycycline  Will use vaginal estrogen cream   Intermittent constipation-- using fiber and stool softeners  Will try vaginal valium    07/08/2024  1. Gyn care  --pap due    2. Contraception  --IUD in place  --no issues  --spotting PRN--not bothersome    3. Ureaplasma  --completed doxycycline-not sure if helped symptoms    4. Constipation  --continue fiber and stool  softener  --follow up with GI    5. Concern for frequent uti vs. Dysfunctional voiding/ pelvic floor tension:  --still having some bladder discomfort: pressure when needs to urinate, cramping   --uro MP helps some  --continue estrogen cream twice weekly--use dime size amount in vagina-- insert to your second knuckle and rub around just inside vaginal opening twice weekly   --helps alot  --trial of vaginal valium 5 mg/ lidocaine 2%/ baclofen 4 %--can use twice daily as needed--I have sent in the prescription to [facility] in [place], [place].  Their phone number is [phone].  Ask for the compounding department.   --suppositories help but too expensive  --ureaplasma--treated; not sure if helped symptoms    Changes since last visit:  Having suprapubic sharp pain --can have some cramping in lower abdomen/ low back  Can get up to 6/10, but generally stays 4/10.   --seem to be a little less frequent: 1-2x/month   --when these happen: uses suppository, stretches, heat, Aleve--helps it drop from a 7/8-->5; lasts 2 days--has to alter activity  Taking myrebetriq 50 mg and vesicare 5 mg.   --helping   --had uribel but stopped for some reason  Has been going to PT x 2 months, was weekly now every 2 weeks.    --has therawand for home use -- using 2x/week  Using vaginal valium suppositories intermittently-- can not afford--had minimal improvement  Has not retested for ureaplasma  Still has intermittent constipation-- followed by GI--recommended miralax daily   --pain worsens with constipation, not drinking enough water   --miralax daily is helping  Using estrogen cream   --uses 1x/week  Denies menses for one year--scant spotting-- has iud in place  Has talk therapist:   --was Rx'd low dose xanax PRN   --looking for 1, theirs is retiring   --taking remeron, zoloft  Vaginal odor: would like to be tested for BV    Past Medical History:   Diagnosis Date    Migraine        Past Surgical History:   Procedure Laterality Date     "TONSILLECTOMY         Family History   Problem Relation Name Age of Onset    Diabetes Father      Hypertension Father         Social History     Socioeconomic History    Marital status: Single   Tobacco Use    Smoking status: Never    Smokeless tobacco: Never   Substance and Sexual Activity    Alcohol use: Yes     Comment: social     Drug use: Yes     Types: Marijuana    Sexual activity: Yes     Partners: Male     Birth control/protection: I.U.D.     Comment: Partner/s sex assigned at birth is male   Social History Narrative    ** Merged History Encounter **            Current Outpatient Medications   Medication Sig Dispense Refill    azelaic acid (AZELEX) 15 % gel Apply topically.      azelastine (ASTELIN) 137 mcg (0.1 %) nasal spray SMARTSIG:Both Nares      BOTOX 200 unit SolR SMARTSI Unit(s) IM Every 3 Months      estradioL (ESTRACE) 0.01 % (0.1 mg/gram) vaginal cream Use 1 gm vaginal and 1 gm around vaginal opening nightly x 2 weeks then twice weekly 42.5 g 3    levocetirizine (XYZAL) 5 MG tablet Take 5 mg by mouth every evening.      LIDOCAINE 2 %, VALIUM 5 MG, BACLOFEN 4 % SUPPOSITORY Place 1 suppository vaginally 2 (two) times daily as needed (pain). 60 each 5    mirtazapine (REMERON) 15 MG tablet Take 15 mg by mouth nightly.      naproxen (NAPROSYN) 500 MG tablet Take 1 tablet (500 mg total) by mouth 2 (two) times daily with meals. 30 tablet 1    primidone (MYSOLINE) 50 MG Tab Take 50 mg by mouth.      rizatriptan (MAXALT) 10 MG tablet Take 10 mg by mouth.      safety needles (BD SAFETYGLIDE NEEDLE) 25 gauge x 1" Ndle Used to inject testosterone 10 each 5    sertraline (ZOLOFT) 100 MG tablet Take 100 mg by mouth once daily.      spironolactone (ALDACTONE) 50 MG tablet Take 50 mg by mouth.      syringe with needle, safety 3 mL 18 gauge x 1" Syrg Use to draw up testosterone 10 each 5    tretinoin (RETIN-A) 0.01 % gel       ALPRAZolam (XANAX) 0.5 MG tablet Take 0.5 mg by mouth daily as needed. (Patient not " "taking: Reported on 5/2/2025)      estradioL (ESTRACE) 0.01 % (0.1 mg/gram) vaginal cream 0.5 grams with applicator or dime-sized amount with finger in vagina nightly x 2 weeks, then twice a week thereafter 42.5 g 11    HYDROcodone-acetaminophen (NORCO)  mg per tablet Take 1 tablet by mouth every 6 (six) hours as needed for Pain. (Patient not taking: Reported on 3/21/2024) 12 tablet 0    methen-jaylonblue-s.phos-phsal-hyo (URIBEL) 118-10-40.8-36 mg Cap Take 1 capsule by mouth 4 (four) times daily as needed (bladder pain). 120 capsule 11    mirabegron (MYRBETRIQ) 50 mg Tb24 Take 1 tablet (50 mg total) by mouth once daily. 30 tablet 11    ondansetron (ZOFRAN-ODT) 4 MG TbDL Take 1 tablet (4 mg total) by mouth every 6 (six) hours as needed. (Patient not taking: Reported on 5/2/2025) 12 tablet 0    solifenacin (VESICARE) 5 MG tablet Take 1 tablet (5 mg total) by mouth once daily. 30 tablet 11    testosterone cypionate (DEPOTESTOTERONE CYPIONATE) 200 mg/mL injection Inject 0.25 mLs (50 mg total) into the muscle every 7 days. 3 ml syringe with 18 g needle  to draw  X 10 25 g 1 inch needle to inject x 10.  Dispose of a vial after each use 4 mL 5     Current Facility-Administered Medications   Medication Dose Route Frequency Provider Last Rate Last Admin    copper intrauterine device 380 square mm  mm  380 mm Intrauterine  Huong Steele NP   380 mm at 05/26/22 1300       Review of patient's allergies indicates:  No Known Allergies    No LMP recorded. (Menstrual status: Birth Control).    Well Woman:  Pap:02/2021 normal      OB History    No obstetric history on file.       Review of Systems as per HPI    Well Woman:  Pap:07/2024 normal HPV negative    Urogynecologic Exam  /68 (BP Location: Right arm, Patient Position: Sitting)   Pulse 73   Ht 5' 8" (1.727 m)   Wt 79.6 kg (175 lb 7.8 oz)   BMI 26.68 kg/m²     GENERAL APPEARANCE:  The patient is well-developed, well-nourished.   Neck:  Supple with no " thyromegaly, no carotid bruits.  Heart:  Regular rate and rhythm, no murmurs, rubs or gallops.  Lungs:  Clear.  No CVA tenderness.  Abdomen:  Soft, nontender, nondistended, no hepatosplenomegaly.  Incisions:  none    PELVIC:    Deferred.   Affirm collected.     ASSESSMENT:   1. Vaginal odor  Vaginosis Screen by DNA Probe      2. Urinary urgency  mirabegron (MYRBETRIQ) 50 mg Tb24      3. Dysuria  methen-m.blue-s.phos-phsal-hyo (URIBEL) 118-10-40.8-36 mg Cap      4. Acute cystitis without hematuria  methen-m.blue-s.phos-phsal-hyo (URIBEL) 118-10-40.8-36 mg Cap      5. Bladder spasm  G6PD,Quantitative    solifenacin (VESICARE) 5 MG tablet      6. Vaginal atrophy  estradioL (ESTRACE) 0.01 % (0.1 mg/gram) vaginal cream      7. Chronic constipation        8. Pelvic floor tension          PLAN:   1. Gyn care  --up to date    2. Contraception  --IUD in place    3. Ureaplasma  --neg on 1/2025 and 3/2025 repeats    4. Constipation  --continue fiber daily  --continue miralax daily  --hydrate well  --follow up with GI    5.Concern for frequent uti vs. Dysfunctional voiding/ pelvic floor tension  --PF muscles seem to trigger/reproduce a lot of the bothersome symptoms on exam today  --treat vaginal dryness:  --continue Vaginal estrogen:  --AT LEAST 2X/WEEK Use 0.5 grams of estrogen cream in vagina with applicator or dime-sized amount with finger (as far as can reach internally) at night.  You can also apply a dime-sized amount with your finger around the vaginal opening and inner lips at same frequency.   --look on amazon for estrogen vaginal applicators    --Vaginal estrogen may help to decrease pain related to dryness with intercourse and urinary symptoms (urgency/frequency/UTIs) around menopause.     --treat pelvic floor muscle tension:  --continue pelvic floor PT.  Call to make appt:  BLAZE Lee: p) [phone].  Or [phone].   --use therawand at home at least 3-4x/week  .    --trial of vaginal valium 5 mg/ lidocaine  2%/ baclofen 4 %--can use twice daily as needed--I have sent in the prescription to [facility] in [place], [place].  Their phone number is [phone].  Ask for the compounding department.    --for urinary urgency/frequency:   --continue mirabegron 50 mg and vesicare 5 mg daily  --use uro-MP up to 4x/day as needed   --test for G6PD deficiency    TALK WITH THERAPIST: about ways to recognize anxiety and not internalize this   --discuss if need any rescues meds with psychiatrist--get name of new MD from them since they are retiring    FOR FLARES:  --take uribel (up to 4x/day), Aleve, heat, suppository, stretches/massage    6.  Vaginal odor:  --affirm    7.  RTC 4 months.      Approx 20 min spent in consult, 90% in discussion.    Christy Lewis  [facility]  Division of Female Pelvic Medicine and Reconstructive Surgery  Department of Obstetrics & Gynecology

## 2025-05-05 LAB — G6PD RBC-CCNT: 9.5 U/G HB (ref 8–11.9)

## 2025-05-07 ENCOUNTER — RESULTS FOLLOW-UP (OUTPATIENT)
Dept: UROGYNECOLOGY | Facility: CLINIC | Age: 30
End: 2025-05-07

## 2025-05-13 ENCOUNTER — CLINICAL SUPPORT (OUTPATIENT)
Dept: REHABILITATION | Facility: OTHER | Age: 30
End: 2025-05-13
Payer: MEDICAID

## 2025-05-13 DIAGNOSIS — M62.89 PELVIC FLOOR TENSION: Primary | ICD-10-CM

## 2025-05-13 DIAGNOSIS — R10.2 PELVIC PAIN: ICD-10-CM

## 2025-05-13 PROCEDURE — 97110 THERAPEUTIC EXERCISES: CPT

## 2025-05-14 NOTE — PROGRESS NOTES
Outpatient Rehab    Physical Therapy Visit    Patient Name: Camilla Mario  MRN: [id number]  YOB: 1995  Encounter Date: 5/13/2025    Therapy Diagnosis:   Encounter Diagnoses   Name Primary?    Pelvic floor tension Yes    Pelvic pain      Physician: Huong Steele NP    Physician Orders: Eval and Treat  Medical Diagnosis: Urinary urgency  Voiding dysfunction    Visit # / Visits Authorized:  9 / 10  Insurance Authorization Period: 1/7/2025 to 12/31/2025  Date of Evaluation: 1/7/2025  Plan of Care Certification: 1/7/2025 to 4/7/2025     PT/PTA:     Number of PTA visits since last PT visit:   Time In: 1110   Time Out: 1145  Total Time: 35   Total Billable Time: 35    FOTO:  Intake Score:  %  Survey Score 1:  %  Survey Score 2:  %         Subjective   Increased pain with and following intercourse this weekend..         Objective            Treatment:  Therapeutic Exercise  TE 1: considerations for positioning, sequencing of events during foreplay, penetration  TE 2: modifications to pelvic wand use for improved translation to relaxation with sexual activity      Time Entry(in minutes):  Therapeutic Exercise Time Entry: 35    Assessment & Plan   Assessment: Based on changes to pain with sexual activity, significant review of positioning and postural considerations performed this session. Implications for pelvic wand use also reviewed and HEP updated to include new positioning and sequencing, including use of vibration for improved relaxation. Plan to review utility NV.       Patient will continue to benefit from skilled outpatient physical therapy to address the deficits listed in the problem list box on initial evaluation, provide pt/family education and to maximize pt's level of independence in the home and community environment.     Patient's spiritual, cultural, and educational needs considered and patient agreeable to plan of care and goals.           Plan: PF elevators + trunk strengthening  with biofeedback    Goals:   Short Term Goals: 6 weeks -progressing  Patient to demonstrate proper use of urge delay strategies to help reduce urge urinary incontinence with activities of daily living.   Pt to report a decrease in pelvic pressure and fullness to no more than 25% of the time to demonstrate improving pelvic support of pelvic structures.  Pt to demonstrate proper diaphragmatic breathing to help with calming the nervous system in order to decrease pain and to improve abdominal wall and pelvic floor musculature extensibility.   Pt to demonstrate good body mechanics when performing ADLs such as lifting and bending to decrease strain to lumbopelvic structures and reduce risk of further injury.  Pt to report a decrease in pain to no more than 2/10 at it's worst with internal vaginal examination.    Pt to report being able to hold urine for at least 30 minutes without reporting a significant increase in pain to demonstrate a return towards prior level of function.   Pt to demonstrate proper positioning on commode with breathing techniques to decrease strain with BM to enable pt to feel empty after BM.   Pt to report feelings of complete emptying with bowel movements at least 50% of the time to demonstrate improving PF coordination.     Long Term Goals: 12 weeks -progressing  Pt to be discharged with home plan for carry over after discharge.    Pt to report elimination of incontinence with ADLs to demonstrate improved pelvic floor muscle strength and coordination.  Pt to report no longer feeling the need to urinate just in case when shopping or participating in social activities to demonstrate improving pelvic floor and bladder control.  Pt to report a decrease in pelvic pressure and fullness to no more than 10% of the time to demonstrate improving pelvic support of pelvic structures.  Pt to increase pelvic floor strength to at least 4/5 to demonstrate improved strength needed for continence with ADLs.   Pt  will be trained and compliant with postural strategies in sitting and standing to improve alignment and decrease pain and muscle fatigue  Pt to report being able to have a comfortable vaginal exam without significant increase in pelvic pain.  Pt to report a decrease in pain to no more than 1/10 at it's worst with vaginal intercourse.    Pt will be independent with use of dilation or pelvic wand use in order to progress towards self management and intercourse.  Pt to report feelings of complete emptying with bowel movements at least 80% of the time to demonstrate improving PF coordination.  Pt to improve hip strength to 5/5 to allow for improved pelvic stability during ADLs.     CARMEN CHAVEZ, PT

## 2025-05-14 NOTE — PROGRESS NOTES
Outpatient Rehab    Physical Therapy Visit    Patient Name: Camilla Mario  MRN: [id number]  YOB: 1995  Encounter Date: 5/1/2025    Therapy Diagnosis:   Encounter Diagnoses   Name Primary?    Pelvic floor tension Yes    Pelvic pain      Physician: Huong Steele NP    Physician Orders: Eval and Treat  Medical Diagnosis: Urinary urgency  Voiding dysfunction    Visit # / Visits Authorized:  9 / 10  Insurance Authorization Period: 1/7/2025 to 12/31/2025  Date of Evaluation: 1/7/2025  Plan of Care Certification: 1/7/2025 to 4/7/2025     PT/PTA:     Number of PTA visits since last PT visit:   Time In: 1615   Time Out: 1700  Total Time: 45   Total Billable Time: 4540    FOTO:  Intake Score:  %  Survey Score 1:  %  Survey Score 2:  %         Subjective   has been focusing on postural alignment and combining scoliosis HEP with pelvic floor HEP. Finds they have worked well together.         Objective            Treatment:  Therapeutic Exercise  TE 1: review of standing posture + TLSO brace  TE 2: review of sitting posture + TLSO brace  TE 3: pelvic floor elevators + TLSO brace  TE 4: pelvic floor relaxation + TLSO brace  TE 5: abdominal MFR      Time Entry(in minutes):  Therapeutic Exercise Time Entry: 45    Assessment & Plan   Assessment: Biofeedback utilized this session to assess role of TLSO use with pelvic floor mobility and control. Most significant improvement in relaxaion noted when weight shifted to L LE with slight ER. Good relaxation demonstrated with R wieght shifting though. Overall, significant improvement in mobilization and pelvic floor control demonstrated.  Evaluation/Treatment Tolerance: Patient tolerated treatment well    Patient will continue to benefit from skilled outpatient physical therapy to address the deficits listed in the problem list box on initial evaluation, provide pt/family education and to maximize pt's level of independence in the home and community environment.      Patient's spiritual, cultural, and educational needs considered and patient agreeable to plan of care and goals.           Plan: review PF elevators    Goals:   Short Term Goals: 6 weeks -progressing  Patient to demonstrate proper use of urge delay strategies to help reduce urge urinary incontinence with activities of daily living.   Pt to report a decrease in pelvic pressure and fullness to no more than 25% of the time to demonstrate improving pelvic support of pelvic structures.  Pt to demonstrate proper diaphragmatic breathing to help with calming the nervous system in order to decrease pain and to improve abdominal wall and pelvic floor musculature extensibility.   Pt to demonstrate good body mechanics when performing ADLs such as lifting and bending to decrease strain to lumbopelvic structures and reduce risk of further injury.  Pt to report a decrease in pain to no more than 2/10 at it's worst with internal vaginal examination.    Pt to report being able to hold urine for at least 30 minutes without reporting a significant increase in pain to demonstrate a return towards prior level of function.   Pt to demonstrate proper positioning on commode with breathing techniques to decrease strain with BM to enable pt to feel empty after BM.   Pt to report feelings of complete emptying with bowel movements at least 50% of the time to demonstrate improving PF coordination.     Long Term Goals: 12 weeks -progressing  Pt to be discharged with home plan for carry over after discharge.    Pt to report elimination of incontinence with ADLs to demonstrate improved pelvic floor muscle strength and coordination.  Pt to report no longer feeling the need to urinate just in case when shopping or participating in social activities to demonstrate improving pelvic floor and bladder control.  Pt to report a decrease in pelvic pressure and fullness to no more than 10% of the time to demonstrate improving pelvic support of pelvic  structures.  Pt to increase pelvic floor strength to at least 4/5 to demonstrate improved strength needed for continence with ADLs.   Pt will be trained and compliant with postural strategies in sitting and standing to improve alignment and decrease pain and muscle fatigue  Pt to report being able to have a comfortable vaginal exam without significant increase in pelvic pain.  Pt to report a decrease in pain to no more than 1/10 at it's worst with vaginal intercourse.    Pt will be independent with use of dilation or pelvic wand use in order to progress towards self management and intercourse.  Pt to report feelings of complete emptying with bowel movements at least 80% of the time to demonstrate improving PF coordination.  Pt to improve hip strength to 5/5 to allow for improved pelvic stability during ADLs.     CARMEN CHAVEZ, PT

## 2025-07-02 ENCOUNTER — PATIENT MESSAGE (OUTPATIENT)
Dept: REHABILITATION | Facility: OTHER | Age: 30
End: 2025-07-02
Payer: MEDICAID

## 2025-09-02 ENCOUNTER — OFFICE VISIT (OUTPATIENT)
Dept: UROGYNECOLOGY | Facility: CLINIC | Age: 30
End: 2025-09-02
Payer: COMMERCIAL

## 2025-09-02 VITALS
HEART RATE: 65 BPM | WEIGHT: 171.5 LBS | BODY MASS INDEX: 25.99 KG/M2 | HEIGHT: 68 IN | DIASTOLIC BLOOD PRESSURE: 65 MMHG | SYSTOLIC BLOOD PRESSURE: 115 MMHG

## 2025-09-02 DIAGNOSIS — R39.15 URINARY URGENCY: Primary | ICD-10-CM

## 2025-09-02 DIAGNOSIS — N39.8 VOIDING DYSFUNCTION: ICD-10-CM

## 2025-09-02 DIAGNOSIS — N95.2 VAGINAL ATROPHY: ICD-10-CM

## 2025-09-02 DIAGNOSIS — K59.09 CHRONIC CONSTIPATION: ICD-10-CM

## 2025-09-02 DIAGNOSIS — M62.89 PELVIC FLOOR TENSION: ICD-10-CM

## 2025-09-02 PROCEDURE — 3078F DIAST BP <80 MM HG: CPT | Mod: CPTII,S$GLB,, | Performed by: NURSE PRACTITIONER

## 2025-09-02 PROCEDURE — 3008F BODY MASS INDEX DOCD: CPT | Mod: CPTII,S$GLB,, | Performed by: NURSE PRACTITIONER

## 2025-09-02 PROCEDURE — 3074F SYST BP LT 130 MM HG: CPT | Mod: CPTII,S$GLB,, | Performed by: NURSE PRACTITIONER

## 2025-09-02 PROCEDURE — 1160F RVW MEDS BY RX/DR IN RCRD: CPT | Mod: CPTII,S$GLB,, | Performed by: NURSE PRACTITIONER

## 2025-09-02 PROCEDURE — 1159F MED LIST DOCD IN RCRD: CPT | Mod: CPTII,S$GLB,, | Performed by: NURSE PRACTITIONER

## 2025-09-02 PROCEDURE — 99999 PR PBB SHADOW E&M-EST. PATIENT-LVL IV: CPT | Mod: PBBFAC,,, | Performed by: NURSE PRACTITIONER

## 2025-09-02 PROCEDURE — 99213 OFFICE O/P EST LOW 20 MIN: CPT | Mod: S$GLB,,, | Performed by: NURSE PRACTITIONER

## 2025-09-04 ENCOUNTER — PATIENT MESSAGE (OUTPATIENT)
Dept: UROGYNECOLOGY | Facility: CLINIC | Age: 30
End: 2025-09-04
Payer: COMMERCIAL

## 2025-09-04 DIAGNOSIS — R39.15 URINARY URGENCY: Primary | ICD-10-CM
